# Patient Record
Sex: FEMALE | Race: WHITE | NOT HISPANIC OR LATINO
[De-identification: names, ages, dates, MRNs, and addresses within clinical notes are randomized per-mention and may not be internally consistent; named-entity substitution may affect disease eponyms.]

---

## 2019-05-03 ENCOUNTER — TRANSCRIPTION ENCOUNTER (OUTPATIENT)
Age: 79
End: 2019-05-03

## 2019-05-20 ENCOUNTER — TRANSCRIPTION ENCOUNTER (OUTPATIENT)
Age: 79
End: 2019-05-20

## 2019-12-04 ENCOUNTER — TRANSCRIPTION ENCOUNTER (OUTPATIENT)
Age: 79
End: 2019-12-04

## 2020-02-16 ENCOUNTER — INPATIENT (INPATIENT)
Facility: HOSPITAL | Age: 80
LOS: 1 days | Discharge: HOME | DRG: 69 | End: 2020-02-18
Attending: FAMILY MEDICINE | Admitting: HOSPITALIST
Payer: MEDICARE

## 2020-02-16 VITALS
DIASTOLIC BLOOD PRESSURE: 93 MMHG | WEIGHT: 132.06 LBS | RESPIRATION RATE: 16 BRPM | OXYGEN SATURATION: 98 % | HEIGHT: 66 IN | SYSTOLIC BLOOD PRESSURE: 201 MMHG | HEART RATE: 87 BPM | TEMPERATURE: 98 F

## 2020-02-16 DIAGNOSIS — M81.0 AGE-RELATED OSTEOPOROSIS WITHOUT CURRENT PATHOLOGICAL FRACTURE: ICD-10-CM

## 2020-02-16 DIAGNOSIS — G47.00 INSOMNIA, UNSPECIFIED: ICD-10-CM

## 2020-02-16 DIAGNOSIS — R47.81 SLURRED SPEECH: ICD-10-CM

## 2020-02-16 DIAGNOSIS — G45.9 TRANSIENT CEREBRAL ISCHEMIC ATTACK, UNSPECIFIED: ICD-10-CM

## 2020-02-16 DIAGNOSIS — Z29.9 ENCOUNTER FOR PROPHYLACTIC MEASURES, UNSPECIFIED: ICD-10-CM

## 2020-02-16 LAB
ALBUMIN SERPL ELPH-MCNC: 3.9 G/DL — SIGNIFICANT CHANGE UP (ref 3.3–5)
ALP SERPL-CCNC: 112 U/L — SIGNIFICANT CHANGE UP (ref 40–120)
ALT FLD-CCNC: 22 U/L — SIGNIFICANT CHANGE UP (ref 12–78)
ANION GAP SERPL CALC-SCNC: 7 MMOL/L — SIGNIFICANT CHANGE UP (ref 5–17)
APTT BLD: 32.8 SEC — SIGNIFICANT CHANGE UP (ref 28.5–37)
AST SERPL-CCNC: 24 U/L — SIGNIFICANT CHANGE UP (ref 15–37)
BASOPHILS # BLD AUTO: 0.04 K/UL — SIGNIFICANT CHANGE UP (ref 0–0.2)
BASOPHILS NFR BLD AUTO: 0.5 % — SIGNIFICANT CHANGE UP (ref 0–2)
BILIRUB SERPL-MCNC: 0.3 MG/DL — SIGNIFICANT CHANGE UP (ref 0.2–1.2)
BUN SERPL-MCNC: 19 MG/DL — SIGNIFICANT CHANGE UP (ref 7–23)
CALCIUM SERPL-MCNC: 9.7 MG/DL — SIGNIFICANT CHANGE UP (ref 8.5–10.1)
CHLORIDE SERPL-SCNC: 103 MMOL/L — SIGNIFICANT CHANGE UP (ref 96–108)
CO2 SERPL-SCNC: 28 MMOL/L — SIGNIFICANT CHANGE UP (ref 22–31)
CREAT SERPL-MCNC: 0.78 MG/DL — SIGNIFICANT CHANGE UP (ref 0.5–1.3)
EOSINOPHIL # BLD AUTO: 0.14 K/UL — SIGNIFICANT CHANGE UP (ref 0–0.5)
EOSINOPHIL NFR BLD AUTO: 1.7 % — SIGNIFICANT CHANGE UP (ref 0–6)
GLUCOSE SERPL-MCNC: 97 MG/DL — SIGNIFICANT CHANGE UP (ref 70–99)
HCT VFR BLD CALC: 38.7 % — SIGNIFICANT CHANGE UP (ref 34.5–45)
HGB BLD-MCNC: 12.3 G/DL — SIGNIFICANT CHANGE UP (ref 11.5–15.5)
IMM GRANULOCYTES NFR BLD AUTO: 0.2 % — SIGNIFICANT CHANGE UP (ref 0–1.5)
INR BLD: 1.1 RATIO — SIGNIFICANT CHANGE UP (ref 0.88–1.16)
LYMPHOCYTES # BLD AUTO: 2.5 K/UL — SIGNIFICANT CHANGE UP (ref 1–3.3)
LYMPHOCYTES # BLD AUTO: 30.7 % — SIGNIFICANT CHANGE UP (ref 13–44)
MCHC RBC-ENTMCNC: 28 PG — SIGNIFICANT CHANGE UP (ref 27–34)
MCHC RBC-ENTMCNC: 31.8 GM/DL — LOW (ref 32–36)
MCV RBC AUTO: 88 FL — SIGNIFICANT CHANGE UP (ref 80–100)
MONOCYTES # BLD AUTO: 0.53 K/UL — SIGNIFICANT CHANGE UP (ref 0–0.9)
MONOCYTES NFR BLD AUTO: 6.5 % — SIGNIFICANT CHANGE UP (ref 2–14)
NEUTROPHILS # BLD AUTO: 4.92 K/UL — SIGNIFICANT CHANGE UP (ref 1.8–7.4)
NEUTROPHILS NFR BLD AUTO: 60.4 % — SIGNIFICANT CHANGE UP (ref 43–77)
NRBC # BLD: 0 /100 WBCS — SIGNIFICANT CHANGE UP (ref 0–0)
PLATELET # BLD AUTO: 274 K/UL — SIGNIFICANT CHANGE UP (ref 150–400)
POTASSIUM SERPL-MCNC: 4.1 MMOL/L — SIGNIFICANT CHANGE UP (ref 3.5–5.3)
POTASSIUM SERPL-SCNC: 4.1 MMOL/L — SIGNIFICANT CHANGE UP (ref 3.5–5.3)
PROT SERPL-MCNC: 8.8 G/DL — HIGH (ref 6–8.3)
PROTHROM AB SERPL-ACNC: 12.4 SEC — SIGNIFICANT CHANGE UP (ref 10–12.9)
RBC # BLD: 4.4 M/UL — SIGNIFICANT CHANGE UP (ref 3.8–5.2)
RBC # FLD: 14.8 % — HIGH (ref 10.3–14.5)
SODIUM SERPL-SCNC: 138 MMOL/L — SIGNIFICANT CHANGE UP (ref 135–145)
WBC # BLD: 8.15 K/UL — SIGNIFICANT CHANGE UP (ref 3.8–10.5)
WBC # FLD AUTO: 8.15 K/UL — SIGNIFICANT CHANGE UP (ref 3.8–10.5)

## 2020-02-16 PROCEDURE — 70496 CT ANGIOGRAPHY HEAD: CPT | Mod: 26

## 2020-02-16 PROCEDURE — 93010 ELECTROCARDIOGRAM REPORT: CPT

## 2020-02-16 PROCEDURE — 70450 CT HEAD/BRAIN W/O DYE: CPT | Mod: 26,59

## 2020-02-16 PROCEDURE — 99223 1ST HOSP IP/OBS HIGH 75: CPT | Mod: GC,AI

## 2020-02-16 PROCEDURE — 70498 CT ANGIOGRAPHY NECK: CPT | Mod: 26

## 2020-02-16 PROCEDURE — 71045 X-RAY EXAM CHEST 1 VIEW: CPT | Mod: 26

## 2020-02-16 PROCEDURE — 99285 EMERGENCY DEPT VISIT HI MDM: CPT

## 2020-02-16 RX ORDER — AMITRIPTYLINE HCL 25 MG
10 TABLET ORAL AT BEDTIME
Refills: 0 | Status: DISCONTINUED | OUTPATIENT
Start: 2020-02-16 | End: 2020-02-18

## 2020-02-16 RX ORDER — HYDRALAZINE HCL 50 MG
5 TABLET ORAL EVERY 6 HOURS
Refills: 0 | Status: DISCONTINUED | OUTPATIENT
Start: 2020-02-16 | End: 2020-02-18

## 2020-02-16 RX ORDER — ASPIRIN/CALCIUM CARB/MAGNESIUM 324 MG
325 TABLET ORAL ONCE
Refills: 0 | Status: COMPLETED | OUTPATIENT
Start: 2020-02-16 | End: 2020-02-16

## 2020-02-16 RX ORDER — ENOXAPARIN SODIUM 100 MG/ML
40 INJECTION SUBCUTANEOUS AT BEDTIME
Refills: 0 | Status: DISCONTINUED | OUTPATIENT
Start: 2020-02-16 | End: 2020-02-18

## 2020-02-16 RX ORDER — ATORVASTATIN CALCIUM 80 MG/1
40 TABLET, FILM COATED ORAL AT BEDTIME
Refills: 0 | Status: DISCONTINUED | OUTPATIENT
Start: 2020-02-16 | End: 2020-02-18

## 2020-02-16 RX ORDER — LANOLIN ALCOHOL/MO/W.PET/CERES
3 CREAM (GRAM) TOPICAL AT BEDTIME
Refills: 0 | Status: DISCONTINUED | OUTPATIENT
Start: 2020-02-16 | End: 2020-02-18

## 2020-02-16 RX ORDER — LANOLIN ALCOHOL/MO/W.PET/CERES
1 CREAM (GRAM) TOPICAL
Qty: 0 | Refills: 0 | DISCHARGE

## 2020-02-16 RX ORDER — ASPIRIN/CALCIUM CARB/MAGNESIUM 324 MG
325 TABLET ORAL DAILY
Refills: 0 | Status: DISCONTINUED | OUTPATIENT
Start: 2020-02-17 | End: 2020-02-18

## 2020-02-16 RX ORDER — ALPRAZOLAM 0.25 MG
0.25 TABLET ORAL ONCE
Refills: 0 | Status: DISCONTINUED | OUTPATIENT
Start: 2020-02-17 | End: 2020-02-17

## 2020-02-16 RX ORDER — AMITRIPTYLINE HCL 25 MG
1 TABLET ORAL
Qty: 0 | Refills: 0 | DISCHARGE

## 2020-02-16 RX ORDER — RISEDRONATE SODIUM 25.8; 4.2 MG/1; MG/1
1 TABLET, FILM COATED ORAL
Qty: 0 | Refills: 0 | DISCHARGE

## 2020-02-16 RX ADMIN — Medication 10 MILLIGRAM(S): at 22:51

## 2020-02-16 RX ADMIN — Medication 325 MILLIGRAM(S): at 19:12

## 2020-02-16 RX ADMIN — ATORVASTATIN CALCIUM 40 MILLIGRAM(S): 80 TABLET, FILM COATED ORAL at 21:10

## 2020-02-16 RX ADMIN — Medication 3 MILLIGRAM(S): at 21:10

## 2020-02-16 RX ADMIN — ENOXAPARIN SODIUM 40 MILLIGRAM(S): 100 INJECTION SUBCUTANEOUS at 21:10

## 2020-02-16 RX ADMIN — Medication 5 MILLIGRAM(S): at 20:14

## 2020-02-16 NOTE — ED PROVIDER NOTE - OBJECTIVE STATEMENT
pt bib family for 5 minute episode of slurred speech and right facial droop and right arm tingling began 30 minutes ago. symptoms now fully resolved. no fevers, ha, cp, sob, abd pain, d/n/v.  pmd - jolynn

## 2020-02-16 NOTE — ED ADULT NURSE NOTE - NS TRANSFER PATIENT BELONGINGS
At [facility], one important tool we use to improve our patient services is our Patient Survey. Following your visit you may receive our survey in the mail. Please take the time to complete the survey. If your visit with us was great, we want to hear about it. If we can improve, please let us know how.
Clothing

## 2020-02-16 NOTE — ED ADULT NURSE NOTE - NSIMPLEMENTINTERV_GEN_ALL_ED
Implemented All Universal Safety Interventions:  [place] to call system. Call bell, personal items and telephone within reach. Instruct patient to call for assistance. Room bathroom lighting operational. Non-slip footwear when patient is off stretcher. Physically safe environment: no spills, clutter or unnecessary equipment. Stretcher in lowest position, wheels locked, appropriate side rails in place.

## 2020-02-16 NOTE — H&P ADULT - NSHPREVIEWOFSYSTEMS_GEN_ALL_CORE
Constitutional: denies fever, chills, sweating  HEENT: denies headache, dizziness, or lightheadedness  Respiratory: denies SOB, cough, or wheezing  Cardiovascular: denies CP, palpitations  Gastrointestinal: denies nausea, vomiting, diarrhea, constipation, abdominal pain, or bloody stools  Genitourinary: denies painful urination, increased frequency, urgency, or bloody urine  Skin/Breast: denies rashes or itching  Musculoskeletal: admits to resolved RUE weakness, R facial droop. denies muscle aches, joint swelling  Neurologic: admits to resolved right hand numbness/tingling, resolved slurred speech; denies loss of sensation  ROS negative except as noted above

## 2020-02-16 NOTE — H&P ADULT - PROBLEM SELECTOR PLAN 4
DVT ppx with lovenox 40mg SQ daily    IMPROVE VTE Individual Risk Assessment          RISK                                                          Points  [  ] Previous VTE                                                3  [  ] Thrombophilia                                             2  [  ] Lower limb paralysis                                   2        (unable to hold up >15 seconds)    [  ] Current Cancer                                             2         (within 6 months)  [  ] Immobilization > 24 hrs                              1  [  ] ICU/CCU stay > 24 hours                             1  [ x ] Age > 60                                                         1    IMPROVE VTE Score: 1

## 2020-02-16 NOTE — ED ADULT NURSE NOTE - CHPI ED NUR SYMPTOMS NEG
no confusion/no loss of consciousness/no change in level of consciousness/no blurred vision/no fever

## 2020-02-16 NOTE — PATIENT PROFILE ADULT - VISION (WITH CORRECTIVE LENSES IF THE PATIENT USUALLY WEARS THEM):
reading glasses at times/Partially impaired: cannot see medication labels or newsprint, but can see obstacles in path, and the surrounding layout; can count fingers at arm's length

## 2020-02-16 NOTE — H&P ADULT - HISTORY OF PRESENT ILLNESS
81 y/o F with PMHx of spastic colon, insomnia presented to the ED with complaint of right arm numbness, facial weakness and slurred speech lasting 5 minutes in duration, occurring 30 minutes prior to ED presentation. Symptoms resolved at present.    In the ED, VS significant for BP to 202/95. Labs unremarkable. CXR (compared to study from 5/2019) showed: previously reported nodular opacity within the medial aspect of the right lower lung field appears unchanged with an additional region of nodular opacity identified overlying the right upper lung field measuring approximately 1 cm. CT chest evaluation is recommended for further assessment. Chronic deformity of proximal right humerus. CT head (stroke protocol) negative for acute hemorrhage or territorial infarct. CTA Brain and Neck: No large vessel occlusion. No high-grade stenosis. 81 y/o F with PMHx of insomnia, osteoporosis, and arthritis presented to the ED with complaint of right arm numbness/tingling/transient paralysis, right facial weakness, and slurred speech. Patient and daughter state that patient was sitting at the table when her right arm began to feel numb, predominantly the right hand, and she started having tingling. Daughter states she then noted her right side of her face was drooping and she began to slur her speech. Daughter states the slurred speech and right facial droop lasted 3-5 minutes. The RUE symptoms lasted ~10 minutes. All symptoms occurred approx 30 minutes prior to ED presentation and all symptoms resolved at present. Patient admits to some disorientation to lights prior to emergence of any symptoms. Denies any similar prior episodes.     In the ED, VS significant for BP to 202/95.   Labs unremarkable.   CXR (compared to study from 5/2019) showed: previously reported nodular opacity within the medial aspect of the right lower lung field appears unchanged with an additional region of nodular opacity identified overlying the right upper lung field measuring approximately 1 cm. CT chest evaluation is recommended for further assessment. Chronic deformity of proximal right humerus.   CT head (stroke protocol) negative for acute hemorrhage or territorial infarct.   CTA Brain and Neck: No large vessel occlusion. No high-grade stenosis.  EKG: NSR 82bpm    S/p ASA 325mg 79 y/o F with PMHx of insomnia, osteoporosis, migraines and arthritis presented to the ED with complaint of right arm numbness/tingling/transient paralysis, right facial weakness, and slurred speech. Patient and daughter state that patient was sitting at the table when her right arm began to feel numb, predominantly the right hand, and she started having tingling. Daughter states she then noted her right side of her face was drooping and she began to slur her speech. Daughter states the slurred speech and right facial droop lasted 3-5 minutes. The RUE symptoms lasted ~10 minutes. All symptoms occurred approx 30 minutes prior to ED presentation and all symptoms resolved at present. Patient admits to some disorientation to lights prior to emergence of any symptoms. Denies any similar prior episodes.     In the ED, VS significant for BP to 202/95.   Labs unremarkable.   CXR (compared to study from 5/2019) showed: previously reported nodular opacity within the medial aspect of the right lower lung field appears unchanged with an additional region of nodular opacity identified overlying the right upper lung field measuring approximately 1 cm. CT chest evaluation is recommended for further assessment. Chronic deformity of proximal right humerus.   CT head (stroke protocol) negative for acute hemorrhage or territorial infarct.   CTA Brain and Neck: No large vessel occlusion. No high-grade stenosis.  EKG: NSR 82bpm    S/p ASA 325mg

## 2020-02-16 NOTE — H&P ADULT - NSICDXPASTMEDICALHX_GEN_ALL_CORE_FT
PAST MEDICAL HISTORY:  Insomnia     Spastic colon PAST MEDICAL HISTORY:  Arthritis     Insomnia     Osteoporosis

## 2020-02-16 NOTE — H&P ADULT - NSHPSOCIALHISTORY_GEN_ALL_CORE
Social History/Preventive Medicine:    Occupation: retired  Lives alone in [place].  Ambulates at home without assistance.    Substance Use:  Tobacco Usage: denies  Alcohol Usage: denies  Illicit Drug Usage: denies

## 2020-02-16 NOTE — H&P ADULT - PROBLEM SELECTOR PLAN 1
TIA vs r/o CVA with resolved slurred speech, RUE weakness/numbness/tingling, and right facial droop  - Admit to telemetry  - S/p ASA 325mg x1 in ED, continue daily  - Continue permissive HTN as per neuro, Hydralazine 5mg IVP Q6H PRN for SBP >200  - CT Head and CTA Brain/Neck negative for acute pathology  - Aspiration precautions, passed bedside swallow in ED  - Fall precautions  - F/u MRI Brain  - F/u TTE results  - F/u Lipid panel, HbA1c, TSH, B12, folate levels  - Neurochecks Q4H  - Neuro Dr. Hendrickson following, recs appreciated TIA vs r/o CVA with resolved slurred speech, RUE weakness/numbness/tingling, and right facial droop  - Admit to telemetry  - S/p ASA 325mg x1 in ED  - continue aspirin and statin daily   - Continue permissive HTN as per neuro, Hydralazine 5mg IVP Q6H PRN for SBP >200, monitor hemodynamics  - CT Head and CTA Brain/Neck negative for acute pathology  - Aspiration precautions, passed bedside swallow in ED, will start diet  - Fall precautions  - F/u MRI Brain (will premedicate with low dose xanax due to claustrophobia)  - F/u TTE results  - F/u Lipid panel, HbA1c, TSH, B12, folate levels  - Neurochecks Q4H  - Neuro Dr. Hendrickson following, recs appreciated

## 2020-02-16 NOTE — ED ADULT NURSE NOTE - OBJECTIVE STATEMENT
received pt in bed alert and oriented x4.  C/O right facial droop and tingling in right hand/arm.  Onset about half hour before arrival.  Pt symptoms have resolved at this time.  Pt reports no numbness or tingling at this time.  Ct complete.  Pt on monitor.  Ongoing nursing care and safety maintained.

## 2020-02-16 NOTE — ED PROVIDER NOTE - CLINICAL SUMMARY MEDICAL DECISION MAKING FREE TEXT BOX
pt with 5 min episode of slurred speech, facial droop, arm numbness now resolved - ekg/ct/xr/labs/neuro

## 2020-02-16 NOTE — H&P ADULT - ASSESSMENT
81 y/o F with PMHx of spastic colon, insomnia presented to the ED with complaint of right arm numbness, facial weakness and slurred speech lasting 5 minutes, with complete resolution of symptoms admitted for TIA, rule out CVA. 81 y/o F with PMHx of migraines, osteoporosis, arthritis, insomnia presented to the ED with complaint of right arm numbness, facial weakness and slurred speech lasting 5 minutes, with complete resolution of symptoms admitted for TIA, rule out CVA.

## 2020-02-16 NOTE — ED PROVIDER NOTE - CARE PLAN
Principal Discharge DX:	Slurred speech  Secondary Diagnosis:	Facial droop  Secondary Diagnosis:	Arm numbness

## 2020-02-16 NOTE — PROGRESS NOTE ADULT - SUBJECTIVE AND OBJECTIVE BOX
neuro cons dicty.  right arm numbness, facial weakness and slurred speech resolved  cw tia, r/o cva  brain mri without.  echo  lipid panel  asa/statin.  neuro check  avoid antihypertensive until sbp over 200 mm.

## 2020-02-17 DIAGNOSIS — R91.1 SOLITARY PULMONARY NODULE: ICD-10-CM

## 2020-02-17 LAB
ALBUMIN SERPL ELPH-MCNC: 3.4 G/DL — SIGNIFICANT CHANGE UP (ref 3.3–5)
ALP SERPL-CCNC: 98 U/L — SIGNIFICANT CHANGE UP (ref 40–120)
ALT FLD-CCNC: 17 U/L — SIGNIFICANT CHANGE UP (ref 12–78)
ANION GAP SERPL CALC-SCNC: 6 MMOL/L — SIGNIFICANT CHANGE UP (ref 5–17)
AST SERPL-CCNC: 16 U/L — SIGNIFICANT CHANGE UP (ref 15–37)
BASOPHILS # BLD AUTO: 0.03 K/UL — SIGNIFICANT CHANGE UP (ref 0–0.2)
BASOPHILS NFR BLD AUTO: 0.4 % — SIGNIFICANT CHANGE UP (ref 0–2)
BILIRUB SERPL-MCNC: 0.4 MG/DL — SIGNIFICANT CHANGE UP (ref 0.2–1.2)
BUN SERPL-MCNC: 16 MG/DL — SIGNIFICANT CHANGE UP (ref 7–23)
CALCIUM SERPL-MCNC: 9.3 MG/DL — SIGNIFICANT CHANGE UP (ref 8.5–10.1)
CHLORIDE SERPL-SCNC: 105 MMOL/L — SIGNIFICANT CHANGE UP (ref 96–108)
CHOLEST SERPL-MCNC: 167 MG/DL — SIGNIFICANT CHANGE UP (ref 10–199)
CO2 SERPL-SCNC: 29 MMOL/L — SIGNIFICANT CHANGE UP (ref 22–31)
CREAT SERPL-MCNC: 0.83 MG/DL — SIGNIFICANT CHANGE UP (ref 0.5–1.3)
EOSINOPHIL # BLD AUTO: 0.13 K/UL — SIGNIFICANT CHANGE UP (ref 0–0.5)
EOSINOPHIL NFR BLD AUTO: 1.9 % — SIGNIFICANT CHANGE UP (ref 0–6)
FOLATE SERPL-MCNC: 19.5 NG/ML — SIGNIFICANT CHANGE UP
GLUCOSE SERPL-MCNC: 95 MG/DL — SIGNIFICANT CHANGE UP (ref 70–99)
HBA1C BLD-MCNC: 5.8 % — HIGH (ref 4–5.6)
HCT VFR BLD CALC: 36.7 % — SIGNIFICANT CHANGE UP (ref 34.5–45)
HDLC SERPL-MCNC: 62 MG/DL — SIGNIFICANT CHANGE UP
HGB BLD-MCNC: 11.9 G/DL — SIGNIFICANT CHANGE UP (ref 11.5–15.5)
IMM GRANULOCYTES NFR BLD AUTO: 0.1 % — SIGNIFICANT CHANGE UP (ref 0–1.5)
LIPID PNL WITH DIRECT LDL SERPL: 94 MG/DL — SIGNIFICANT CHANGE UP
LYMPHOCYTES # BLD AUTO: 2.2 K/UL — SIGNIFICANT CHANGE UP (ref 1–3.3)
LYMPHOCYTES # BLD AUTO: 32.4 % — SIGNIFICANT CHANGE UP (ref 13–44)
MAGNESIUM SERPL-MCNC: 2.2 MG/DL — SIGNIFICANT CHANGE UP (ref 1.6–2.6)
MCHC RBC-ENTMCNC: 28.2 PG — SIGNIFICANT CHANGE UP (ref 27–34)
MCHC RBC-ENTMCNC: 32.4 GM/DL — SIGNIFICANT CHANGE UP (ref 32–36)
MCV RBC AUTO: 87 FL — SIGNIFICANT CHANGE UP (ref 80–100)
MONOCYTES # BLD AUTO: 0.66 K/UL — SIGNIFICANT CHANGE UP (ref 0–0.9)
MONOCYTES NFR BLD AUTO: 9.7 % — SIGNIFICANT CHANGE UP (ref 2–14)
NEUTROPHILS # BLD AUTO: 3.77 K/UL — SIGNIFICANT CHANGE UP (ref 1.8–7.4)
NEUTROPHILS NFR BLD AUTO: 55.5 % — SIGNIFICANT CHANGE UP (ref 43–77)
NRBC # BLD: 0 /100 WBCS — SIGNIFICANT CHANGE UP (ref 0–0)
PHOSPHATE SERPL-MCNC: 3.3 MG/DL — SIGNIFICANT CHANGE UP (ref 2.5–4.5)
PLATELET # BLD AUTO: 273 K/UL — SIGNIFICANT CHANGE UP (ref 150–400)
POTASSIUM SERPL-MCNC: 3.7 MMOL/L — SIGNIFICANT CHANGE UP (ref 3.5–5.3)
POTASSIUM SERPL-SCNC: 3.7 MMOL/L — SIGNIFICANT CHANGE UP (ref 3.5–5.3)
PROT SERPL-MCNC: 7.8 G/DL — SIGNIFICANT CHANGE UP (ref 6–8.3)
RBC # BLD: 4.22 M/UL — SIGNIFICANT CHANGE UP (ref 3.8–5.2)
RBC # FLD: 14.8 % — HIGH (ref 10.3–14.5)
SODIUM SERPL-SCNC: 140 MMOL/L — SIGNIFICANT CHANGE UP (ref 135–145)
TOTAL CHOLESTEROL/HDL RATIO MEASUREMENT: 2.7 RATIO — LOW (ref 3.3–7.1)
TRIGL SERPL-MCNC: 57 MG/DL — SIGNIFICANT CHANGE UP (ref 10–149)
TSH SERPL-MCNC: 8.47 UIU/ML — HIGH (ref 0.36–3.74)
VIT B12 SERPL-MCNC: 421 PG/ML — SIGNIFICANT CHANGE UP (ref 232–1245)
WBC # BLD: 6.8 K/UL — SIGNIFICANT CHANGE UP (ref 3.8–10.5)
WBC # FLD AUTO: 6.8 K/UL — SIGNIFICANT CHANGE UP (ref 3.8–10.5)

## 2020-02-17 PROCEDURE — 70551 MRI BRAIN STEM W/O DYE: CPT | Mod: 26

## 2020-02-17 PROCEDURE — 93306 TTE W/DOPPLER COMPLETE: CPT | Mod: 26

## 2020-02-17 PROCEDURE — 99233 SBSQ HOSP IP/OBS HIGH 50: CPT

## 2020-02-17 PROCEDURE — 99222 1ST HOSP IP/OBS MODERATE 55: CPT

## 2020-02-17 RX ORDER — AMLODIPINE BESYLATE 2.5 MG/1
10 TABLET ORAL DAILY
Refills: 0 | Status: DISCONTINUED | OUTPATIENT
Start: 2020-02-17 | End: 2020-02-18

## 2020-02-17 RX ADMIN — ENOXAPARIN SODIUM 40 MILLIGRAM(S): 100 INJECTION SUBCUTANEOUS at 21:19

## 2020-02-17 RX ADMIN — ATORVASTATIN CALCIUM 40 MILLIGRAM(S): 80 TABLET, FILM COATED ORAL at 21:19

## 2020-02-17 RX ADMIN — Medication 325 MILLIGRAM(S): at 13:35

## 2020-02-17 RX ADMIN — Medication 1 TABLET(S): at 13:35

## 2020-02-17 RX ADMIN — Medication 0.25 MILLIGRAM(S): at 10:23

## 2020-02-17 RX ADMIN — Medication 3 MILLIGRAM(S): at 21:19

## 2020-02-17 RX ADMIN — Medication 10 MILLIGRAM(S): at 21:19

## 2020-02-17 RX ADMIN — AMLODIPINE BESYLATE 10 MILLIGRAM(S): 2.5 TABLET ORAL at 15:48

## 2020-02-17 NOTE — PROGRESS NOTE ADULT - SUBJECTIVE AND OBJECTIVE BOX
Patient is a 80y old  Female who presents with a chief complaint of right arm numbness, facial weakness and slurred speech (16 Feb 2020 18:42)      INTERVAL HPI/OVERNIGHT EVENTS: No new symptoms, events, complaints. Remains afebrile. Denies chest pain, palpitation, sob. States that never has these type of symptoms  before.     MEDICATIONS  (STANDING):  amitriptyline 10 milliGRAM(s) Oral at bedtime  aspirin 325 milliGRAM(s) Oral daily  atorvastatin 40 milliGRAM(s) Oral at bedtime  calcium carbonate 1250 mG  + Vitamin D (OsCal 500 + D) 1 Tablet(s) Oral daily  enoxaparin Injectable 40 milliGRAM(s) SubCutaneous at bedtime  melatonin 3 milliGRAM(s) Oral at bedtime    MEDICATIONS  (PRN):  ALPRAZolam 0.25 milliGRAM(s) Oral once PRN Prior to MRI  hydrALAZINE Injectable 5 milliGRAM(s) IV Push every 6 hours PRN sbp >200      Allergies    No Known Allergies    Intolerances        REVIEW OF SYSTEMS:  CONSTITUTIONAL: No fever, or fatigue  EYES: No eye pain, visual disturbances, or discharge  ENMT:  No difficulty hearing, tinnitus, vertigo; No sinus or throat pain  NECK: No pain or stiffness  RESPIRATORY: No cough, wheezing, chills or hemoptysis; No shortness of breath  CARDIOVASCULAR: No chest pain, palpitations, dizziness, or leg swelling  GASTROINTESTINAL: No abdominal or epigastric pain. No nausea, vomiting, or hematemesis; No diarrhea or constipation.   GENITOURINARY: No dysuria, frequency, hematuria, or incontinence  NEUROLOGICAL: No headaches, memory loss, loss of strength, numbness, or tremors  SKIN: No itching, burning, rashes, or lesions   LYMPH NODES: No enlarged glands  ENDOCRINE: No heat or cold intolerance; No hair loss; No polydipsia or polyuria  MUSCULOSKELETAL: No joint pain or swelling; No muscle, back, or extremity pain  PSYCHIATRIC: No depression, anxiety, mood swings, or difficulty sleeping  HEME/LYMPH: No easy bruising, or bleeding gums  ALLERGY AND IMMUNOLOGIC: No hives or eczema    Vital Signs Last 24 Hrs  T(C): 36.9 (17 Feb 2020 07:07), Max: 37.2 (16 Feb 2020 22:07)  T(F): 98.4 (17 Feb 2020 07:07), Max: 99 (16 Feb 2020 22:07)  HR: 84 (17 Feb 2020 07:07) (63 - 92)  BP: 177/81 (17 Feb 2020 07:07) (128/68 - 212/102)  BP(mean): --  RR: 16 (17 Feb 2020 07:07) (16 - 19)  SpO2: 98% (17 Feb 2020 07:07) (96% - 100%)    PHYSICAL EXAM:  GENERAL: NAD, well-groomed, well-developed  HEAD:  Atraumatic, Normocephalic  EYES: EOMI, PERRLA, conjunctiva and sclera clear  ENMT: No tonsillar erythema, exudates, or enlargement; Moist mucous membranes  NECK: Supple, No JVD, Normal thyroid  NERVOUS SYSTEM:  Alert & Oriented X3, Good concentration; Motor Strength 5/5 B/L upper and lower extremities  CHEST/LUNG: Clear to auscultation bilaterally; No rales, rhonchi, wheezing, or rubs  HEART: Regular rate and rhythm; No murmurs, rubs, or gallops  ABDOMEN: Soft, Nontender, Nondistended; Bowel sounds present  EXTREMITIES:  2+ Peripheral Pulses, No clubbing, cyanosis, or edema  LYMPH: No lymphadenopathy noted  SKIN: No rashes or lesions    LABS:                        11.9   6.80  )-----------( 273      ( 17 Feb 2020 07:18 )             36.7     17 Feb 2020 07:18    140    |  105    |  16     ----------------------------<  95     3.7     |  29     |  0.83     Ca    9.3        17 Feb 2020 07:18  Phos  3.3       17 Feb 2020 07:18  Mg     2.2       17 Feb 2020 07:18    TPro  7.8    /  Alb  3.4    /  TBili  0.4    /  DBili  x      /  AST  16     /  ALT  17     /  AlkPhos  98     17 Feb 2020 07:18    PT/INR - ( 16 Feb 2020 16:57 )   PT: 12.4 sec;   INR: 1.10 ratio         PTT - ( 16 Feb 2020 16:57 )  PTT:32.8 sec  CAPILLARY BLOOD GLUCOSE      POCT Blood Glucose.: 90 mg/dL (16 Feb 2020 16:33)    BLOOD CULTURE    RADIOLOGY & ADDITIONAL TESTS:    Imaging Personally Reviewed:  [ ] YES     Consultant(s) Notes Reviewed:      Care Discussed with Consultants/Other Providers:

## 2020-02-17 NOTE — CONSULT NOTE ADULT - ASSESSMENT
80 year old female with insomnia and arthritis, who presents with arm and facial numbness, suggestive of a TIA.  She has no significant cardiac history and no symptoms suggestive of Paroxysmal atrial fibrillation in the past.    - symptoms consistent with TIA. MRI is pending.  - remains in a sr on telemetry. No atrial arrythmia noted to date. Continue to watch.  - await echocardiogram  - discussed the fact that a cryptogenic TIA could be indicative of AF, and she will need longer term monitoring  - cont asa and statin  - permissive HTN per neurology, though will need to anti-hypertensives within next 24 hours  - neuro follow up    - no sign of acute ischemia  - no sign of volume overload    - watch creatinine and electrolytes. Keep K>4, mg>2  - will follow with you

## 2020-02-17 NOTE — PROGRESS NOTE ADULT - SUBJECTIVE AND OBJECTIVE BOX
Neurology Follow up note    NICHOLAS [id number]    HPI:  79 y/o F with PMHx of insomnia, osteoporosis, migraines and arthritis presented to the ED with complaint of right arm numbness/tingling/transient paralysis, right facial weakness, and slurred speech. Patient and daughter state that patient was sitting at the table when her right arm began to feel numb, predominantly the right hand, and she started having tingling. Daughter states she then noted her right side of her face was drooping and she began to slur her speech. Daughter states the slurred speech and right facial droop lasted 3-5 minutes. The RUE symptoms lasted ~10 minutes. All symptoms occurred approx 30 minutes prior to ED presentation and all symptoms resolved at present. Patient admits to some disorientation to lights prior to emergence of any symptoms. Denies any similar prior episodes.     In the ED, VS significant for BP to 202/95.   Labs unremarkable.   CXR (compared to study from 5/2019) showed: previously reported nodular opacity within the medial aspect of the right lower lung field appears unchanged with an additional region of nodular opacity identified overlying the right upper lung field measuring approximately 1 cm. CT chest evaluation is recommended for further assessment. Chronic deformity of proximal right humerus.   CT head (stroke protocol) negative for acute hemorrhage or territorial infarct.   CTA Brain and Neck: No large vessel occlusion. No high-grade stenosis.  EKG: NSR 82bpm    S/p ASA 325mg (16 Feb 2020 18:42)      Interval History - no complaints    Patient is seen, chart was reviewed and case was discussed with the treatment team.  Pt is not in any distress.   Lying on bed comfortably.   No events reported overnight.   No clinical seizure was reported..    Vital Signs Last 24 Hrs  T(C): 36.7 (17 Feb 2020 15:27), Max: 37.2 (16 Feb 2020 22:07)  T(F): 98.1 (17 Feb 2020 15:27), Max: 99 (16 Feb 2020 22:07)  HR: 79 (17 Feb 2020 15:27) (63 - 92)  BP: 144/75 (17 Feb 2020 15:27) (128/68 - 212/102)  BP(mean): --  RR: 16 (17 Feb 2020 15:27) (16 - 19)  SpO2: 99% (17 Feb 2020 15:27) (96% - 100%)    Height (cm): 167.64 (02-16 @ 16:16)  Weight (kg): 59.9 (02-16 @ 16:16)  BMI (kg/m2): 21.3 (02-16 @ 16:16)    REVIEW OF SYSTEMS:    Constitutional: No fever, weight loss or fatigue  Eyes: No eye pain, visual disturbances, or discharge  ENT:  No difficulty hearing, tinnitus, vertigo; No sinus or throat pain  Neck: No pain or stiffness  Respiratory: No cough, wheezing, chills or hemoptysis  Cardiovascular: No chest pain, palpitations, shortness of breath, dizziness or leg swelling  Gastrointestinal: No abdominal or epigastric pain. No nausea, vomiting or hematemesis; No diarrhea or constipation. No melena or hematochezia.  Genitourinary: No dysuria, frequency, hematuria or incontinence  Neurological: No headaches, memory loss, loss of strength, numbness or tremors  Psychiatric: No depression, anxiety, mood swings or difficulty sleeping  Musculoskeletal: No joint pain or swelling; No muscle, back or extremity pain  Skin: No itching, burning, rashes or lesions   Lymph Nodes: No enlarged glands  Endocrine: No heat or cold intolerance;     On Neurological Examination:    Mental Status - Pt is alert, awake, oriented X3. Higher functions are intact. Follows commands well and able to answer questions appropriately  .Mood and affect  normal    Speech -  Normal.     Cranial Nerves - Pupils 3 mm equal and reactive to light, extraocular eye movements intact. Pt has no visual field deficit.  Pt has no  facial asymmetry. Facial sensation is intact.Tongue - is in midline.    Muscle tone - is normal all over. Moves all extremities equally. No asymmetry is seen.      Motor Exam - 5/5 all over, No drift. No shaking or tremors.    Sensory Exam - Pin prick, temperature, joint position and vibration are intact on either side. Pt withdraws all extremities equally on stimulation. No asymmetry seen. No complaints of tingling, numbness.    Gait - Able to stand and walk unassisted.      coordination:    Finger to nose: normal  .    Deep tendon Reflexes - 2 plus all over.        Romberg - Negative.    Neck Supple -  Yes.     MEDICATIONS    amitriptyline 10 milliGRAM(s) Oral at bedtime  amLODIPine   Tablet 10 milliGRAM(s) Oral daily  aspirin 325 milliGRAM(s) Oral daily  atorvastatin 40 milliGRAM(s) Oral at bedtime  calcium carbonate 1250 mG  + Vitamin D (OsCal 500 + D) 1 Tablet(s) Oral daily  enoxaparin Injectable 40 milliGRAM(s) SubCutaneous at bedtime  hydrALAZINE Injectable 5 milliGRAM(s) IV Push every 6 hours PRN  melatonin 3 milliGRAM(s) Oral at bedtime      Allergies    No Known Allergies    Intolerances        LABS:  CBC Full  -  ( 17 Feb 2020 07:18 )  WBC Count : 6.80 K/uL  RBC Count : 4.22 M/uL  Hemoglobin : 11.9 g/dL  Hematocrit : 36.7 %  Platelet Count - Automated : 273 K/uL  Mean Cell Volume : 87.0 fl      02-17    140  |  105  |  16  ----------------------------<  95  3.7   |  29  |  0.83    Ca    9.3      17 Feb 2020 07:18  Phos  3.3     02-17  Mg     2.2     02-17    TPro  7.8  /  Alb  3.4  /  TBili  0.4  /  DBili  x   /  AST  16  /  ALT  17  /  AlkPhos  98  02-17    Hemoglobin A1C: Hemoglobin A1C, Whole Blood: 5.8 % (02-17 @ 08:14)    Lipid Panel 02-17 @ 10:26  Total Cholesterol, Serum 167  LDL 94  Triglycerides 57    Vitamin B12 Vitamin B12, Serum: 421 pg/mL (02-17 @ 10:26)      RADIOLOGY    ASSESSMENT AND PLAN:      likely TIA  BRAIN MRI - NO ACUTE CVA  HTN    CONTINUE AP/STATIN  STARTED ON AMLODIPINE  Physical therapy evaluation.  Pain is accessed and addressed.  Plan of care was discussed with family. Questions answered.  Would continue to follow.

## 2020-02-17 NOTE — CONSULT NOTE ADULT - REASON FOR ADMISSION
right arm numbness, facial weakness and slurred speech
right arm numbness, facial weakness and slurred speech

## 2020-02-17 NOTE — CONSULT NOTE ADULT - SUBJECTIVE AND OBJECTIVE BOX
Date/Time Patient Seen:  		  Referring MD:   Data Reviewed	       Patient is a 80y old  Female who presents with a chief complaint of right arm numbness, facial weakness and slurred speech (17 Feb 2020 09:40)      Subjective/HPI    in bed  seen and examined  vs and meds reviewed  labs and imaging reviewed    non smoker  non drinker    cxr nodules reviewed with pt  pt was recently seen in [facility] - seen by Pulm and had ct chest         History of Present Illness:  Reason for Admission: right arm numbness, facial weakness and slurred speech  History of Present Illness:   81 y/o F with PMHx of insomnia, osteoporosis, migraines and arthritis presented to the ED with complaint of right arm numbness/tingling/transient paralysis, right facial weakness, and slurred speech. Patient and daughter state that patient was sitting at the table when her right arm began to feel numb, predominantly the right hand, and she started having tingling. Daughter states she then noted her right side of her face was drooping and she began to slur her speech. Daughter states the slurred speech and right facial droop lasted 3-5 minutes. The RUE symptoms lasted ~10 minutes. All symptoms occurred approx 30 minutes prior to ED presentation and all symptoms resolved at present. Patient admits to some disorientation to lights prior to emergence of any symptoms. Denies any similar prior episodes.     PAST SURGICAL HISTORY:  No significant past surgical history.    FAMILY HISTORY:  FH: lung cancer  FH: ovarian cancer  FHx: asthma.     Social History:  Social History (marital status, living situation, occupation, tobacco use, alcohol and drug use, and sexual history): Social History/Preventive Medicine:    	Occupation: retired  	Lives alone in alf community.  	Ambulates at home without assistance.    	Substance Use:  	Tobacco Usage: denies  	Alcohol Usage: denies  Illicit Drug Usage: denies     Tobacco Screening:  · Core Measure Site	Yes  · Has the patient used tobacco in the past 30 days?	No    Risk Assessment:    Present on Admission:  Deep Venous Thrombosis	no  Pulmonary Embolus	no     Heart Failure:  Does this patient have a history of or has been diagnosed with heart failure? no.     PAST MEDICAL & SURGICAL HISTORY:  Arthritis  Osteoporosis  Insomnia  Spastic colon  No pertinent past medical history  No significant past surgical history        Medication list         MEDICATIONS  (STANDING):  amitriptyline 10 milliGRAM(s) Oral at bedtime  aspirin 325 milliGRAM(s) Oral daily  atorvastatin 40 milliGRAM(s) Oral at bedtime  calcium carbonate 1250 mG  + Vitamin D (OsCal 500 + D) 1 Tablet(s) Oral daily  enoxaparin Injectable 40 milliGRAM(s) SubCutaneous at bedtime  melatonin 3 milliGRAM(s) Oral at bedtime    MEDICATIONS  (PRN):  ALPRAZolam 0.25 milliGRAM(s) Oral once PRN Prior to MRI  hydrALAZINE Injectable 5 milliGRAM(s) IV Push every 6 hours PRN sbp >200         Vitals log        ICU Vital Signs Last 24 Hrs  T(C): 36.9 (17 Feb 2020 07:07), Max: 37.2 (16 Feb 2020 22:07)  T(F): 98.4 (17 Feb 2020 07:07), Max: 99 (16 Feb 2020 22:07)  HR: 84 (17 Feb 2020 07:07) (63 - 92)  BP: 177/81 (17 Feb 2020 07:07) (128/68 - 212/102)  BP(mean): --  ABP: --  ABP(mean): --  RR: 16 (17 Feb 2020 07:07) (16 - 19)  SpO2: 98% (17 Feb 2020 07:07) (96% - 100%)           Input and Output:  I&O's Detail    16 Feb 2020 07:01  -  17 Feb 2020 07:00  --------------------------------------------------------  IN:  Total IN: 0 mL    OUT:    Voided: 450 mL  Total OUT: 450 mL    Total NET: -450 mL          Lab Data                        11.9   6.80  )-----------( 273      ( 17 Feb 2020 07:18 )             36.7     02-17    140  |  105  |  16  ----------------------------<  95  3.7   |  29  |  0.83    Ca    9.3      17 Feb 2020 07:18  Phos  3.3     02-17  Mg     2.2     02-17    TPro  7.8  /  Alb  3.4  /  TBili  0.4  /  DBili  x   /  AST  16  /  ALT  17  /  AlkPhos  98  02-17            Review of Systems	  anxious      Objective     Physical Examination    verbal  alert  head nc  head at  heart s1s2  lung dec BS  abd soft      Pertinent Lab findings & Imaging      Blevins:  NO   Adequate UO     I&O's Detail    16 Feb 2020 07:01  -  17 Feb 2020 07:00  --------------------------------------------------------  IN:  Total IN: 0 mL    OUT:    Voided: 450 mL  Total OUT: 450 mL    Total NET: -450 mL               Discussed with:     Cultures:	        Radiology            EXAM:  XR CHEST AP OR PA 1V                            PROCEDURE DATE:  02/16/2020          INTERPRETATION:  INDICATION: Code stroke    PRIORS: 5/3/2019    VIEWS: Portable AP radiography of the chest performed.    FINDINGS: Heart size appears within normal limits. No superior mediastinal widening is identified. Previously reported nodular opacity within the medial aspect of the right lower lung field appears unchanged. There is an additional region of nodular opacity identified overlying the right upper lung field measuring approximately 1 cm. CT chest evaluation is recommended for further assessment. There is no evidence for acute left lung infiltrate. No pleural effusion or pneumothorax is demonstrated. No mediastinal shift is noted. No acute osseous fractures identified. Chronic appearing deformity of the proximal right humerus noted.    IMPRESSION: Previously reported nodular opacity within the medial aspect of the right lower lung field appears unchanged. There is an additional region of nodular opacity identified overlying the right upper lung field measuring approximately 1 cm. CT chest evaluation is recommended for further assessment.    The ED was notified of this result utilizing the [email or web address] discrepancy system.                TONIO BURROUGHS   This document has been electronically signed. Feb 16 2020  5:14PM

## 2020-02-17 NOTE — PROGRESS NOTE ADULT - ATTENDING COMMENTS
F/u MRI, TTE  D/c planning once cleared by Neuro F/u MRI, TTE  D/c planning once cleared by Neuro, based upon PT evaluation  and after  BP is controlled.

## 2020-02-17 NOTE — PROGRESS NOTE ADULT - ASSESSMENT
81 y/o F with PMHx of migraines, osteoporosis, arthritis, insomnia presented to the ED with complaint of right arm numbness, facial weakness and slurred speech lasting 5 minutes, with complete resolution of symptoms admitted for TIA, rule out CVA.
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
## 2020-02-17 NOTE — CONSULT NOTE ADULT - PROBLEM SELECTOR RECOMMENDATION 9
neuro and cardio eval in progress  MRI and ECHO pending  on tele monitor  CXR reviewed  pulm nodules  had a ct chest in [facility] - will attempt to obtain report -   will follow up  pt is a lifetime non smoker

## 2020-02-18 ENCOUNTER — TRANSCRIPTION ENCOUNTER (OUTPATIENT)
Age: 80
End: 2020-02-18

## 2020-02-18 VITALS
OXYGEN SATURATION: 97 % | SYSTOLIC BLOOD PRESSURE: 130 MMHG | DIASTOLIC BLOOD PRESSURE: 75 MMHG | HEART RATE: 80 BPM | TEMPERATURE: 98 F | RESPIRATION RATE: 17 BRPM

## 2020-02-18 LAB
ANION GAP SERPL CALC-SCNC: 5 MMOL/L — SIGNIFICANT CHANGE UP (ref 5–17)
BUN SERPL-MCNC: 17 MG/DL — SIGNIFICANT CHANGE UP (ref 7–23)
CALCIUM SERPL-MCNC: 9.3 MG/DL — SIGNIFICANT CHANGE UP (ref 8.5–10.1)
CHLORIDE SERPL-SCNC: 106 MMOL/L — SIGNIFICANT CHANGE UP (ref 96–108)
CO2 SERPL-SCNC: 30 MMOL/L — SIGNIFICANT CHANGE UP (ref 22–31)
CREAT SERPL-MCNC: 0.85 MG/DL — SIGNIFICANT CHANGE UP (ref 0.5–1.3)
GLUCOSE SERPL-MCNC: 93 MG/DL — SIGNIFICANT CHANGE UP (ref 70–99)
HCT VFR BLD CALC: 37.9 % — SIGNIFICANT CHANGE UP (ref 34.5–45)
HGB BLD-MCNC: 12 G/DL — SIGNIFICANT CHANGE UP (ref 11.5–15.5)
MCHC RBC-ENTMCNC: 27.9 PG — SIGNIFICANT CHANGE UP (ref 27–34)
MCHC RBC-ENTMCNC: 31.7 GM/DL — LOW (ref 32–36)
MCV RBC AUTO: 88.1 FL — SIGNIFICANT CHANGE UP (ref 80–100)
NRBC # BLD: 0 /100 WBCS — SIGNIFICANT CHANGE UP (ref 0–0)
PLATELET # BLD AUTO: 280 K/UL — SIGNIFICANT CHANGE UP (ref 150–400)
POTASSIUM SERPL-MCNC: 4.2 MMOL/L — SIGNIFICANT CHANGE UP (ref 3.5–5.3)
POTASSIUM SERPL-SCNC: 4.2 MMOL/L — SIGNIFICANT CHANGE UP (ref 3.5–5.3)
RBC # BLD: 4.3 M/UL — SIGNIFICANT CHANGE UP (ref 3.8–5.2)
RBC # FLD: 14.8 % — HIGH (ref 10.3–14.5)
SODIUM SERPL-SCNC: 141 MMOL/L — SIGNIFICANT CHANGE UP (ref 135–145)
WBC # BLD: 7.14 K/UL — SIGNIFICANT CHANGE UP (ref 3.8–10.5)
WBC # FLD AUTO: 7.14 K/UL — SIGNIFICANT CHANGE UP (ref 3.8–10.5)

## 2020-02-18 PROCEDURE — 71045 X-RAY EXAM CHEST 1 VIEW: CPT

## 2020-02-18 PROCEDURE — 85027 COMPLETE CBC AUTOMATED: CPT

## 2020-02-18 PROCEDURE — 70551 MRI BRAIN STEM W/O DYE: CPT

## 2020-02-18 PROCEDURE — 84443 ASSAY THYROID STIM HORMONE: CPT

## 2020-02-18 PROCEDURE — 93005 ELECTROCARDIOGRAM TRACING: CPT

## 2020-02-18 PROCEDURE — 80061 LIPID PANEL: CPT

## 2020-02-18 PROCEDURE — 83036 HEMOGLOBIN GLYCOSYLATED A1C: CPT

## 2020-02-18 PROCEDURE — 83735 ASSAY OF MAGNESIUM: CPT

## 2020-02-18 PROCEDURE — 84100 ASSAY OF PHOSPHORUS: CPT

## 2020-02-18 PROCEDURE — 85610 PROTHROMBIN TIME: CPT

## 2020-02-18 PROCEDURE — 82746 ASSAY OF FOLIC ACID SERUM: CPT

## 2020-02-18 PROCEDURE — 36415 COLL VENOUS BLD VENIPUNCTURE: CPT

## 2020-02-18 PROCEDURE — 82962 GLUCOSE BLOOD TEST: CPT

## 2020-02-18 PROCEDURE — 70498 CT ANGIOGRAPHY NECK: CPT

## 2020-02-18 PROCEDURE — 82607 VITAMIN B-12: CPT

## 2020-02-18 PROCEDURE — 70450 CT HEAD/BRAIN W/O DYE: CPT

## 2020-02-18 PROCEDURE — 99285 EMERGENCY DEPT VISIT HI MDM: CPT | Mod: 25

## 2020-02-18 PROCEDURE — 80048 BASIC METABOLIC PNL TOTAL CA: CPT

## 2020-02-18 PROCEDURE — 80053 COMPREHEN METABOLIC PANEL: CPT

## 2020-02-18 PROCEDURE — 99239 HOSP IP/OBS DSCHRG MGMT >30: CPT

## 2020-02-18 PROCEDURE — 85730 THROMBOPLASTIN TIME PARTIAL: CPT

## 2020-02-18 PROCEDURE — 99232 SBSQ HOSP IP/OBS MODERATE 35: CPT

## 2020-02-18 PROCEDURE — 70496 CT ANGIOGRAPHY HEAD: CPT

## 2020-02-18 RX ORDER — ATORVASTATIN CALCIUM 80 MG/1
1 TABLET, FILM COATED ORAL
Qty: 14 | Refills: 0
Start: 2020-02-18 | End: 2020-03-02

## 2020-02-18 RX ORDER — AMLODIPINE BESYLATE 2.5 MG/1
1 TABLET ORAL
Qty: 14 | Refills: 0
Start: 2020-02-18 | End: 2020-03-02

## 2020-02-18 RX ORDER — ASPIRIN/CALCIUM CARB/MAGNESIUM 324 MG
1 TABLET ORAL
Qty: 0 | Refills: 0 | DISCHARGE
Start: 2020-02-18

## 2020-02-18 RX ORDER — AMLODIPINE BESYLATE 2.5 MG/1
1 TABLET ORAL
Qty: 0 | Refills: 0 | DISCHARGE
Start: 2020-02-18

## 2020-02-18 RX ORDER — ASPIRIN/CALCIUM CARB/MAGNESIUM 324 MG
1 TABLET ORAL
Qty: 14 | Refills: 0
Start: 2020-02-18 | End: 2020-03-02

## 2020-02-18 RX ORDER — ATORVASTATIN CALCIUM 80 MG/1
1 TABLET, FILM COATED ORAL
Qty: 0 | Refills: 0 | DISCHARGE
Start: 2020-02-18

## 2020-02-18 RX ADMIN — Medication 325 MILLIGRAM(S): at 11:58

## 2020-02-18 RX ADMIN — Medication 1 TABLET(S): at 11:59

## 2020-02-18 RX ADMIN — AMLODIPINE BESYLATE 10 MILLIGRAM(S): 2.5 TABLET ORAL at 06:10

## 2020-02-18 NOTE — PROGRESS NOTE ADULT - REASON FOR ADMISSION
right arm numbness, facial weakness and slurred speech

## 2020-02-18 NOTE — DISCHARGE NOTE PROVIDER - NSDCQMSTROKERISK_NEU_ALL_CORE
Subjective:      History was provided by the mother. Holly Harris is a 4 y.o. female who is brought in for this well child visit. Birth History    Birth     Length: 1' 8\" (0.508 m)     Weight: 7 lb 3 oz (3.26 kg)    Gestation Age: 40 wks     There are no active problems to display for this patient. History reviewed. No pertinent past medical history. Immunization History   Administered Date(s) Administered    DTAP Vaccine 2008, 2008, 2008, 09/01/2009    DTaP-IPV 04/12/2013    HIB Vaccine 2008, 2008, 2008, 09/01/2009    Hep A Vaccine 2 Dose Schedule (Ped/Adol) 07/15/2015, 04/11/2016    Hepatitis B Vaccine 2008, 2008, 2008    IPV 2008, 2008, 2008, 06/08/2009    Influenza Nasal Vaccine 12/30/2014    Influenza Nasal Vaccine (Quad) 12/23/2015    Influenza Vaccine (Quad) PF 10/26/2016    Influenza Vaccine Nasal 10/11/2011    MMR Vaccine 02/27/2009, 03/06/2012    Pneumococcal Vaccine (Pcv) 2008, 2008, 2008, 03/24/2011    Rotavirus Vaccine 2008, 2008, 2008    Varicella Virus Vaccine Live 02/27/2009, 03/06/2012     History of previous adverse reactions to immunizations:no    Current Issues:  Current concerns on the part of Carys mother include none. Toilet trained? yes  Concerns regarding hearing? no  Does pt snore? (Sleep apnea screening) no     Review of Nutrition:  Current dietary habits: appetite good    Social Screening:  Current child-care arrangements: in home: primary caregiver: mother  Parental coping and self-care: Doing well; no concerns. Opportunities for peer interaction? yes  Concerns regarding behavior with peers? no  School performance: Doing well; no concerns.   Secondhand smoke exposure?  no    Objective:     Visit Vitals    /65 (BP 1 Location: Left arm, BP Patient Position: Sitting)    Pulse 97    Temp 98.2 °F (36.8 °C) (Oral)    Ht (!) 4' 8\" (1.422 m)  Wt 102 lb (46.3 kg)    SpO2 99%    BMI 22.87 kg/m2     Growth parameters are noted and are appropriate for age. Vision screening done:no    General:  alert, cooperative, no distress, appears stated age   Gait:  normal   Skin:  no rashes, no ecchymoses, no petechiae, no nodules, no jaundice, no purpura, no wounds   Oral cavity:  Lips, mucosa, and tongue normal. Teeth and gums normal   Eyes:  sclerae white, pupils equal and reactive, red reflex normal bilaterally   Ears:  normal bilateral   Neck:  supple, symmetrical, trachea midline, no adenopathy, thyroid: not enlarged, symmetric, no tenderness/mass/nodules, no carotid bruit and no JVD   Lungs/Chest: clear to auscultation bilaterally   Heart:  regular rate and rhythm, S1, S2 normal, no murmur, click, rub or gallop   Abdomen: soft, non-tender. Bowel sounds normal. No masses,  no organomegaly   :    Extremities:  extremities normal, atraumatic, no cyanosis or edema   Neuro:  normal without focal findings  mental status, speech normal, alert and oriented x iii  RENETTA  reflexes normal and symmetric       Assessment:     Healthy 4  y.o. 2  m.o. old exam    Plan:     1. Anticipatory guidance:Gave handout on well-child issues at this age, importance of varied diet, minimize junk food, importance of regular dental care, reading together; Samantha Strajohnnye 19 card; limiting TV; media violence, car seat/seat belts; don't put in front seat of cars w/airbags;bicycle helmets, teaching child how to deal with strangers, skim or lowfat milk best, proper dental care    Verbal and written instructions (see AVS) provided. Patient expresses understanding of diagnosis and treatment plan. History of a stroke or TIA High blood pressure/History of a stroke or TIA

## 2020-02-18 NOTE — PROGRESS NOTE ADULT - SUBJECTIVE AND OBJECTIVE BOX
Neurology Follow up note    NICHOLAS [id number]    HPI:  79 y/o F with PMHx of insomnia, osteoporosis, migraines and arthritis presented to the ED with complaint of right arm numbness/tingling/transient paralysis, right facial weakness, and slurred speech. Patient and daughter state that patient was sitting at the table when her right arm began to feel numb, predominantly the right hand, and she started having tingling. Daughter states she then noted her right side of her face was drooping and she began to slur her speech. Daughter states the slurred speech and right facial droop lasted 3-5 minutes. The RUE symptoms lasted ~10 minutes. All symptoms occurred approx 30 minutes prior to ED presentation and all symptoms resolved at present. Patient admits to some disorientation to lights prior to emergence of any symptoms. Denies any similar prior episodes.     In the ED, VS significant for BP to 202/95.   Labs unremarkable.   CXR (compared to study from 5/2019) showed: previously reported nodular opacity within the medial aspect of the right lower lung field appears unchanged with an additional region of nodular opacity identified overlying the right upper lung field measuring approximately 1 cm. CT chest evaluation is recommended for further assessment. Chronic deformity of proximal right humerus.   CT head (stroke protocol) negative for acute hemorrhage or territorial infarct.   CTA Brain and Neck: No large vessel occlusion. No high-grade stenosis.  EKG: NSR 82bpm    S/p ASA 325mg (16 Feb 2020 18:42)      Interval History - did not sleep well    Patient is seen, chart was reviewed and case was discussed with the treatment team.  Pt is not in any distress.   Lying on bed comfortably.   No events reported overnight.   No clinical seizure was reported..    Vital Signs Last 24 Hrs  T(C): 36.7 (18 Feb 2020 07:17), Max: 36.9 (17 Feb 2020 12:47)  T(F): 98 (18 Feb 2020 07:17), Max: 98.5 (17 Feb 2020 12:47)  HR: 79 (18 Feb 2020 07:17) (78 - 92)  BP: 125/74 (18 Feb 2020 07:17) (124/71 - 155/72)  BP(mean): --  RR: 17 (18 Feb 2020 07:17) (16 - 18)  SpO2: 98% (18 Feb 2020 07:17) (97% - 99%)    REVIEW OF SYSTEMS:    Constitutional: No fever, weight loss or fatigue  Eyes: No eye pain, visual disturbances, or discharge  ENT:  No difficulty hearing, tinnitus, vertigo; No sinus or throat pain  Neck: No pain or stiffness  Respiratory: No cough, wheezing, chills or hemoptysis  Cardiovascular: No chest pain, palpitations, shortness of breath, dizziness or leg swelling  Gastrointestinal: No abdominal or epigastric pain. No nausea, vomiting or hematemesis; No diarrhea or constipation. No melena or hematochezia.  Genitourinary: No dysuria, frequency, hematuria or incontinence  Neurological: No headaches, memory loss, loss of strength, numbness or tremors  Psychiatric: No depression, anxiety, mood swings or difficulty sleeping  Musculoskeletal: No joint pain or swelling; No muscle, back or extremity pain  Skin: No itching, burning, rashes or lesions   Lymph Nodes: No enlarged glands  Endocrine: No heat or cold intolerance;     On Neurological Examination:    Mental Status - Pt is alert, awake, oriented X3. Higher functions are intact. Follows commands well and able to answer questions appropriately  .Mood and affect  normal    Speech -  Normal.     Cranial Nerves - Pupils 3 mm equal and reactive to light, extraocular eye movements intact. Pt has no visual field deficit.  Pt has no  facial asymmetry. Facial sensation is intact.Tongue - is in midline.    Muscle tone - is normal all over. Moves all extremities equally. No asymmetry is seen.      Motor Exam - 5/5 all over, No drift. No shaking or tremors.    Sensory Exam - Pin prick, temperature, joint position and vibration are intact on either side. Pt withdraws all extremities equally on stimulation. No asymmetry seen. No complaints of tingling, numbness.    Gait - Able to stand and walk unassisted.      coordination:    Finger to nose: normal  .    Deep tendon Reflexes - 2 plus all over.        Romberg - Negative.    Neck Supple -  Yes.     MEDICATIONS    amitriptyline 10 milliGRAM(s) Oral at bedtime  amLODIPine   Tablet 10 milliGRAM(s) Oral daily  aspirin 325 milliGRAM(s) Oral daily  atorvastatin 40 milliGRAM(s) Oral at bedtime  calcium carbonate 1250 mG  + Vitamin D (OsCal 500 + D) 1 Tablet(s) Oral daily  enoxaparin Injectable 40 milliGRAM(s) SubCutaneous at bedtime  hydrALAZINE Injectable 5 milliGRAM(s) IV Push every 6 hours PRN  melatonin 3 milliGRAM(s) Oral at bedtime      Allergies    No Known Allergies    Intolerances        LABS:  CBC Full  -  ( 17 Feb 2020 07:18 )  WBC Count : 6.80 K/uL  RBC Count : 4.22 M/uL  Hemoglobin : 11.9 g/dL  Hematocrit : 36.7 %  Platelet Count - Automated : 273 K/uL  Mean Cell Volume : 87.0 fl      02-17    140  |  105  |  16  ----------------------------<  95  3.7   |  29  |  0.83    Ca    9.3      17 Feb 2020 07:18  Phos  3.3     02-17  Mg     2.2     02-17    TPro  7.8  /  Alb  3.4  /  TBili  0.4  /  DBili  x   /  AST  16  /  ALT  17  /  AlkPhos  98  02-17    Hemoglobin A1C: Hemoglobin A1C, Whole Blood: 5.8 % (02-17 @ 08:14)    Lipid Panel 02-17 @ 10:26  Total Cholesterol, Serum 167  LDL 94  Triglycerides 57    Vitamin B12 Vitamin B12, Serum: 421 pg/mL (02-17 @ 10:26)      RADIOLOGY    ASSESSMENT AND PLAN:     presented slurred speech, right facial weakness and arm numbness-   likely TIA  BRAIN MRI - NO ACUTE CVA  HTN    CONSIDER OUT PATIENT HOLTER/ILR  CONTINUE AP/STATIN  STARTED ON AMLODIPINE  PLAN OF MANAGEMENT  DW PATIENT AND HER DAUGHTER  Physical therapy evaluation.  Pain is accessed and addressed.  Plan of care was discussed with family. Questions answered.  Would continue to follow. [see HPI] : see HPI [Negative] : Gastrointestinal

## 2020-02-18 NOTE — PHARMACOTHERAPY INTERVENTION NOTE - COMMENTS
Counseled patient regarding new discharge medications, aspirin, atorvastatin, amlodipine uses and side effects.

## 2020-02-18 NOTE — DISCHARGE NOTE PROVIDER - CARE PROVIDER_API CALL
Lior,   Phone: (   )    -  Fax: (   )    -  Follow Up Time: Latasha Tinajero  [address] [place] [address]  Phone: [phone]  Fax: (   )    -  Follow Up Time:

## 2020-02-18 NOTE — PROGRESS NOTE ADULT - ASSESSMENT
79 y/o F with PMHx of migraines, osteoporosis, arthritis, insomnia presented to the ED with complaint of right arm numbness, facial weakness and slurred speech lasting 5 minutes, with complete resolution of symptoms admitted for TIA, rule out CVA.

## 2020-02-18 NOTE — PROGRESS NOTE ADULT - ASSESSMENT
80 year old female with insomnia and arthritis, who presents with arm and facial numbness, suggestive of a TIA.  She has no significant cardiac history and no symptoms suggestive of Paroxysmal atrial fibrillation in the past.    - symptoms consistent with TIA. MRI w/o evidence of acute ischemia  - remains in a sr on telemetry. No atrial arrythmia noted to date. Continue to watch.  - echocardiogram nL LV & RV size and function no significant valvular dysfunction EF 55-60% Stage I DD  - discussed the fact that a cryptogenic TIA could be indicative of AF, and she will need longer term monitoring  - cont asa and statin  - CW amlodipine 10 mg daily  - neuro following    - no sign of acute ischemia  - no sign of volume overload    - watch creatinine and electrolytes. Keep K>4, mg>2  - will follow with you  Juve Emery [facility]  Cardiology   Spectra #[phone]

## 2020-02-18 NOTE — DISCHARGE NOTE NURSING/CASE MANAGEMENT/SOCIAL WORK - NSDCPEPTSTRK_GEN_ALL_CORE
Prescribed medications/Signs and symptoms of stroke/Stroke support groups for patients, families, and friends/Risk factors for stroke/Stroke warning signs and symptoms/Call 911 for stroke/Stroke education booklet/Need for follow up after discharge

## 2020-02-18 NOTE — DISCHARGE NOTE PROVIDER - PROVIDER TOKENS
FREE:[LAST:[Lior],PHONE:[(   )    -],FAX:[(   )    -]] FREE:[LAST:[Lior],FIRST:[Latasha],PHONE:[phone]],FAX:[(   )    -],ADDRESS:[[address] [place] [id number]]]

## 2020-02-18 NOTE — PROGRESS NOTE ADULT - SUBJECTIVE AND OBJECTIVE BOX
Patient is a 80y old  Female who presents with a chief complaint of right arm numbness, facial weakness and slurred speech (18 Feb 2020 07:18)      FROM ADMISSION H+P:   HPI:  79 y/o F with PMHx of insomnia, osteoporosis, migraines and arthritis presented to the ED with complaint of right arm numbness/tingling/transient paralysis, right facial weakness, and slurred speech. Patient and daughter state that patient was sitting at the table when her right arm began to feel numb, predominantly the right hand, and she started having tingling. Daughter states she then noted her right side of her face was drooping and she began to slur her speech. Daughter states the slurred speech and right facial droop lasted 3-5 minutes. The RUE symptoms lasted ~10 minutes. All symptoms occurred approx 30 minutes prior to ED presentation and all symptoms resolved at present. Patient admits to some disorientation to lights prior to emergence of any symptoms. Denies any similar prior episodes.     In the ED, VS significant for BP to 202/95.   Labs unremarkable.   CXR (compared to study from 5/2019) showed: previously reported nodular opacity within the medial aspect of the right lower lung field appears unchanged with an additional region of nodular opacity identified overlying the right upper lung field measuring approximately 1 cm. CT chest evaluation is recommended for further assessment. Chronic deformity of proximal right humerus.   CT head (stroke protocol) negative for acute hemorrhage or territorial infarct.   CTA Brain and Neck: No large vessel occlusion. No high-grade stenosis.  EKG: NSR 82bpm    S/p ASA 325mg (16 Feb 2020 18:42)      ----  INTERVAL HPI/OVERNIGHT EVENTS: Pt seen and evaluated at the bedside. No acute overnight events occurred.     ----  PAST MEDICAL & SURGICAL HISTORY:  Arthritis  Osteoporosis  Insomnia  No significant past surgical history      FAMILY HISTORY:  FH: ovarian cancer  FH: lung cancer  FHx: asthma      Allergies    No Known Allergies    Intolerances        ----  REVIEW OF SYSTEMS:  CONSTITUTIONAL: denies fever, chills, fatigue, weakness  HEENT: denies blurred vision, sore throat  SKIN: denies new lesions, rash  CARDIOVASCULAR: denies chest pain, chest pressure, palpitations  RESPIRATORY: denies shortness of breath, sputum production  GASTROINTESTINAL: denies nausea, vomiting, diarrhea, abdominal pain  GENITOURINARY: denies dysuria, discharge  NEUROLOGICAL: denies numbness, headache, focal weakness  MUSCULOSKELETAL: denies new joint pain, muscle aches  HEMATOLOGIC: denies gross bleeding, bruising  LYMPHATICS: denies enlarged lymph nodes, extremity swelling  PSYCHIATRIC: denies recent changes in anxiety, depression  ENDOCRINOLOGIC: denies sweating, cold or heat intolerance    ----  PHYSICAL EXAM:  GENERAL: patient appears well, no acute distress, appropriately interactive  EYES: sclera clear, no exudates  ENMT: oropharynx clear without erythema, moist mucous membranes  NECK: supple, soft  LUNGS: good air entry bilaterally, clear to auscultation, symmetric breath sounds, no wheezing or rhonchi appreciated  HEART: soft S1/S2, regular rate and rhythm, no murmurs noted, no noted edema to b/l LE  GASTROINTESTINAL: abdomen is soft, nontender, nondistended, normoactive bowel sounds, no palpable masses  INTEGUMENT: good skin turgor, appropriate for ethnicity, appears well perfused, no jaundice noted  MUSCULOSKELETAL: no clubbing or cyanosis, no obvious deformity  NEUROLOGIC: awake, alert, oriented x3, good muscle tone in 4 extremities, no obvious sensory deficits  PSYCHIATRIC: mood is good, affect is congruent with mood, linear and logical thought process  HEME/LYMPH: no obvious ecchymosis     T(C): 36.7 (02-18-20 @ 07:17), Max: 36.9 (02-17-20 @ 12:47)  HR: 79 (02-18-20 @ 07:17) (78 - 92)  BP: 125/74 (02-18-20 @ 07:17) (124/71 - 155/72)  RR: 17 (02-18-20 @ 07:17) (16 - 18)  SpO2: 98% (02-18-20 @ 07:17) (97% - 99%)  Wt(kg): --    ----  I&O's Summary      LABS:                        12.0   7.14  )-----------( 280      ( 18 Feb 2020 06:52 )             37.9     02-18    141  |  106  |  17  ----------------------------<  93  4.2   |  30  |  0.85    Ca    9.3      18 Feb 2020 06:52  Phos  3.3     02-17  Mg     2.2     02-17    TPro  7.8  /  Alb  3.4  /  TBili  0.4  /  DBili  x   /  AST  16  /  ALT  17  /  AlkPhos  98  02-17    PT/INR - ( 16 Feb 2020 16:57 )   PT: 12.4 sec;   INR: 1.10 ratio         PTT - ( 16 Feb 2020 16:57 )  PTT:32.8 sec    CAPILLARY BLOOD GLUCOSE                    ----  Personally reviewed:  Vital sign trends: [ x ] yes    [  ] no     [  ] n/a  Laboratory results: [ x ] yes    [  ] no     [  ] n/a  Radiology results: [  ] yes    [  ] no     [  ] n/a  Culture results: [  ] yes    [  ] no     [  ] n/a  Consultant recommendations: [  ] yes    [  ] no     [  ] n/a

## 2020-02-18 NOTE — PROGRESS NOTE ADULT - PROBLEM SELECTOR PLAN 1
pulm nodule - ct chest reviewed from [facility] - sub cm nodules - no intervention and low suspicion for Cancer / Malignancy  pt follows with Pulm Medicine Team at [facility]  will follow up for Surveillance work up and management of Pulm Nodules  Non Smoker  no Acute CVA - Neuro follow up reviewed  Cardio assessment noted  will follow  dc planning   cvs rx regimen and BP rx regimen optimization
No
TIA vs r/o CVA with resolved slurred speech, RUE weakness/numbness/tingling, and right facial droop  - Monitor on  telemetry  - S/p ASA 325mg x1 in ED  - continue aspirin and statin daily   - Continue permissive HTN as per neuro, Hydralazine 5mg IVP Q6H PRN for SBP >200, monitor hemodynamics  - CT Head and CTA Brain/Neck negative for acute pathology  - Aspiration precautions, passed bedside swallow in ED, will start diet  - Fall precautions  - F/u MRI Brain (will premedicate with low dose xanax due to claustrophobia)  - F/u TTE results  - F/u Lipid panel, HbA1c, TSH, B12, folate levels  - Neurochecks Q4H  - Neuro Dr. Hendrickson following,
TIA vs r/o CVA with resolved slurred speech, RUE weakness/numbness/tingling, and right facial droop  - Monitor on  telemetry  - S/p ASA 325mg x1 in ED  - continue aspirin and statin daily   - Continue permissive HTN as per neuro, Hydralazine 5mg IVP Q6H PRN for SBP >200, monitor hemodynamics  - CT Head and CTA Brain/Neck negative for acute pathology  - Aspiration precautions, passed bedside swallow in ED, will start diet  - Fall precautions  - F/u MRI Brain (will premedicate with low dose xanax due to claustrophobia)  - F/u TTE results  - F/u Lipid panel, HbA1c, TSH, B12, folate levels  - Neurochecks Q4H  - Neuro Dr. Hendrickson following,

## 2020-02-18 NOTE — PROGRESS NOTE ADULT - SUBJECTIVE AND OBJECTIVE BOX
Date/Time Patient Seen:  		  Referring MD:   Data Reviewed	       Patient is a 80y old  Female who presents with a chief complaint of right arm numbness, facial weakness and slurred speech (17 Feb 2020 16:10)      Subjective/HPI     PAST MEDICAL & SURGICAL HISTORY:  Arthritis  Osteoporosis  Insomnia  Spastic colon  No pertinent past medical history  No significant past surgical history        Medication list         MEDICATIONS  (STANDING):  amitriptyline 10 milliGRAM(s) Oral at bedtime  amLODIPine   Tablet 10 milliGRAM(s) Oral daily  aspirin 325 milliGRAM(s) Oral daily  atorvastatin 40 milliGRAM(s) Oral at bedtime  calcium carbonate 1250 mG  + Vitamin D (OsCal 500 + D) 1 Tablet(s) Oral daily  enoxaparin Injectable 40 milliGRAM(s) SubCutaneous at bedtime  melatonin 3 milliGRAM(s) Oral at bedtime    MEDICATIONS  (PRN):  hydrALAZINE Injectable 5 milliGRAM(s) IV Push every 6 hours PRN sbp >200         Vitals log        ICU Vital Signs Last 24 Hrs  T(C): 36.7 (18 Feb 2020 07:17), Max: 36.9 (17 Feb 2020 12:47)  T(F): 98 (18 Feb 2020 07:17), Max: 98.5 (17 Feb 2020 12:47)  HR: 79 (18 Feb 2020 07:17) (78 - 92)  BP: 125/74 (18 Feb 2020 07:17) (124/71 - 155/72)  BP(mean): --  ABP: --  ABP(mean): --  RR: 17 (18 Feb 2020 07:17) (16 - 18)  SpO2: 98% (18 Feb 2020 07:17) (97% - 99%)           Input and Output:  I&O's Detail      Lab Data                        12.0   7.14  )-----------( 280      ( 18 Feb 2020 06:52 )             37.9     02-17    140  |  105  |  16  ----------------------------<  95  3.7   |  29  |  0.83    Ca    9.3      17 Feb 2020 07:18  Phos  3.3     02-17  Mg     2.2     02-17    TPro  7.8  /  Alb  3.4  /  TBili  0.4  /  DBili  x   /  AST  16  /  ALT  17  /  AlkPhos  98  02-17            Review of Systems	      Objective     Physical Examination    heart s1s2  lung dec BS  abd soft  head nc  head at  on room air      Pertinent Lab findings & Imaging      Blevins:  NO   Adequate UO     I&O's Detail           Discussed with:     Cultures:	        Radiology

## 2020-02-18 NOTE — DISCHARGE NOTE NURSING/CASE MANAGEMENT/SOCIAL WORK - PATIENT PORTAL LINK FT
You can access the FollowMyHealth Patient Portal offered by [facility] by registering at the following website: [email or web address]. By joining [employer]’s FollowMyHealth portal, you will also be able to view your health information using other applications (apps) compatible with our system.

## 2020-02-18 NOTE — DISCHARGE NOTE PROVIDER - NSDCMRMEDTOKEN_GEN_ALL_CORE_FT
Adult Aspirin 325 mg oral tablet: 1 tab(s) orally once a day  amitriptyline 10 mg oral tablet: 1 tab(s) orally once a day (at bedtime)  Caltrate 600 + D oral tablet: 1 tab(s) orally 2 times a day  Lipitor 40 mg oral tablet: 1 tab(s) orally once a day (at bedtime)  Melatonin 3 mg oral tablet: 1 tab(s) orally once a day (at bedtime)  naproxen 500 mg oral tablet: 1 tab(s) orally 2 times a day, As Needed  Norvasc 10 mg oral tablet: 1 tab(s) orally once a day  risedronate 150 mg oral tablet: 1 tab(s) orally once a month

## 2020-02-18 NOTE — DISCHARGE NOTE PROVIDER - HOSPITAL COURSE
ADMISSION H+P:        HPI:    81 y/o F with PMHx of insomnia, osteoporosis, migraines and arthritis presented to the ED with complaint of right arm numbness/tingling/transient paralysis, right facial weakness, and slurred speech. Patient and daughter state that patient was sitting at the table when her right arm began to feel numb, predominantly the right hand, and she started having tingling. Daughter states she then noted her right side of her face was drooping and she began to slur her speech. Daughter states the slurred speech and right facial droop lasted 3-5 minutes. The RUE symptoms lasted ~10 minutes. All symptoms occurred approx 30 minutes prior to ED presentation and all symptoms resolved at present. Patient admits to some disorientation to lights prior to emergence of any symptoms. Denies any similar prior episodes.         In the ED, VS significant for BP to 202/95.     Labs unremarkable.     CXR (compared to study from 5/2019) showed: previously reported nodular opacity within the medial aspect of the right lower lung field appears unchanged with an additional region of nodular opacity identified overlying the right upper lung field measuring approximately 1 cm. CT chest evaluation is recommended for further assessment. Chronic deformity of proximal right humerus.     CT head (stroke protocol) negative for acute hemorrhage or territorial infarct.     CTA Brain and Neck: No large vessel occlusion. No high-grade stenosis.    EKG: NSR 82bpm        S/p ASA 325mg (16 Feb 2020 18:42)            ---    HOSPITAL COURSE: Patient was admitted to telemetry for TIA vs r/o CVA. Cardiology ([facility]) was consulted. Patient was started on Aspirin 81 mg qd, Lipitor 40 mg qd, and Norvasc 10 mg qd. Neurology (Dr. Hendrickson) was consulted. CT Head and CTA Brain/Neck was negative for acute pathology. MRI Brain was negative for any acute ischemic changes. TTE showed ----. CXR showed previously reported nodular opacity within the medial aspect of the right lower lung field appears unchanged. There is an additional region of nodular opacity identified overlying the right upper lung field measuring approximately 1 cm. Pulmonology (Dr. Azul) was consulted. CT chest was reviewed from [facility] and revealed sub cm nodules, suggesting low suspicion for cancer/malignancy and requiring no intervention at this time. Patient will follow up with pulmonary medicine team at [facility]. Patient was medically optimized and improved clinically throughout hospital course. Patient seen and examined on day of discharge.        Vital Signs    T(C): 36.7 (18 Feb 2020 07:17), Max: 36.9 (17 Feb 2020 12:47)    T(F): 98 (18 Feb 2020 07:17), Max: 98.5 (17 Feb 2020 12:47)    HR: 79 (18 Feb 2020 07:17) (78 - 92)    BP: 125/74 (18 Feb 2020 07:17) (124/71 - 155/72)    RR: 17 (18 Feb 2020 07:17) (16 - 18)    SpO2: 98% (18 Feb 2020 07:17) (97% - 99%)        Physical Exam:    General: well-developed, well-nourished, NAD    HEENT: normocephalic, atraumatic, EOMI, moist mucous membranes     Neck: supple, non-tender, no masses    Neurology: AAOx3, sensation intact    Respiratory: clear to auscultation bilaterally; no wheezes, rhonchi, or rales    CV: regular rate and rhythm, soft S1/S2, no murmurs, rubs, or gallops    Abdominal: soft, non-tender, non-distended, bowel sounds present    Extremities: no clubbing, cyanosis, or edema; palpable peripheral pulses    Musculoskeletal: no joint erythema or warmth, no joint swelling     Skin: warm, dry, normal color        Patient is medically stable for discharge to home with outpatient follow up.    ---    CONSULTANTS:     Pulmonology (Dr. Azul)     Cardiology ([facility])    Neurology (Dr. Hendrickson)    ---    FINAL DISCHARGE DIAGNOSIS LIST:    Please see last daily progress note for final discharge diagnoses ADMISSION H+P:        HPI:    79 y/o F with PMHx of insomnia, osteoporosis, migraines and arthritis presented to the ED with complaint of right arm numbness/tingling/transient paralysis, right facial weakness, and slurred speech. Patient and daughter state that patient was sitting at the table when her right arm began to feel numb, predominantly the right hand, and she started having tingling. Daughter states she then noted her right side of her face was drooping and she began to slur her speech. Daughter states the slurred speech and right facial droop lasted 3-5 minutes. The RUE symptoms lasted ~10 minutes. All symptoms occurred approx 30 minutes prior to ED presentation and all symptoms resolved at present. Patient admits to some disorientation to lights prior to emergence of any symptoms. Denies any similar prior episodes.         In the ED, VS significant for BP to 202/95.     Labs unremarkable.     CXR (compared to study from 5/2019) showed: previously reported nodular opacity within the medial aspect of the right lower lung field appears unchanged with an additional region of nodular opacity identified overlying the right upper lung field measuring approximately 1 cm. CT chest evaluation is recommended for further assessment. Chronic deformity of proximal right humerus.     CT head (stroke protocol) negative for acute hemorrhage or territorial infarct.     CTA Brain and Neck: No large vessel occlusion. No high-grade stenosis.    EKG: NSR 82bpm        S/p ASA 325mg (16 Feb 2020 18:42)            ---    HOSPITAL COURSE: Patient was admitted to telemetry for TIA vs r/o CVA. Cardiology ([facility]) was consulted. Patient was started on Aspirin 81 mg qd, Lipitor 40 mg qd, and Norvasc 10 mg qd. Neurology (Dr. Hendrickson) was consulted. CT Head and CTA Brain/Neck was negative for acute pathology. MRI Brain was negative for any acute ischemic changes. TTE showed ----. CXR showed previously reported nodular opacity within the medial aspect of the right lower lung field appears unchanged. There is an additional region of nodular opacity identified overlying the right upper lung field measuring approximately 1 cm. Pulmonology (Dr. Azul) was consulted. CT chest was reviewed from [facility] and revealed sub cm nodules, suggesting low suspicion for cancer/malignancy and requiring no intervention at this time. Patient will follow up with pulmonary medicine team at [facility]. Patient was medically optimized and improved clinically throughout hospital course. Patient seen and examined on day of discharge.        Vital Signs    T(C): 36.7 (18 Feb 2020 07:17), Max: 36.9 (17 Feb 2020 12:47)    T(F): 98 (18 Feb 2020 07:17), Max: 98.5 (17 Feb 2020 12:47)    HR: 79 (18 Feb 2020 07:17) (78 - 92)    BP: 125/74 (18 Feb 2020 07:17) (124/71 - 155/72)    RR: 17 (18 Feb 2020 07:17) (16 - 18)    SpO2: 98% (18 Feb 2020 07:17) (97% - 99%)        Physical Exam:    General: well-developed, well-nourished, NAD    HEENT: normocephalic, atraumatic, EOMI, moist mucous membranes     Neck: supple, non-tender, no masses    Neurology: AAOx3, sensation intact    Respiratory: clear to auscultation bilaterally; no wheezes, rhonchi, or rales    CV: regular rate and rhythm, soft S1/S2, no murmurs, rubs, or gallops    Abdominal: soft, non-tender, non-distended, bowel sounds present    Extremities: no clubbing, cyanosis, or edema; palpable peripheral pulses    Musculoskeletal: no joint erythema or warmth, no joint swelling     Skin: warm, dry, normal color        Patient is medically stable for discharge to home with outpatient follow up.    ---    CONSULTANTS:     Pulmonology (Dr. Azul)     Cardiology ([facility])    Neurology (Dr. Hendrickson)    ---    FINAL DISCHARGE DIAGNOSIS LIST:    Please see last daily progress note for final discharge diagnoses        time spent more than 33 minutes ADMISSION H+P:        HPI:    81 y/o F with PMHx of insomnia, osteoporosis, migraines and arthritis presented to the ED with complaint of right arm numbness/tingling/transient paralysis, right facial weakness, and slurred speech. Patient and daughter state that patient was sitting at the table when her right arm began to feel numb, predominantly the right hand, and she started having tingling. Daughter states she then noted her right side of her face was drooping and she began to slur her speech. Daughter states the slurred speech and right facial droop lasted 3-5 minutes. The RUE symptoms lasted ~10 minutes. All symptoms occurred approx 30 minutes prior to ED presentation and all symptoms resolved at present. Patient admits to some disorientation to lights prior to emergence of any symptoms. Denies any similar prior episodes.         In the ED, VS significant for BP to 202/95.     Labs unremarkable.     CXR (compared to study from 5/2019) showed: previously reported nodular opacity within the medial aspect of the right lower lung field appears unchanged with an additional region of nodular opacity identified overlying the right upper lung field measuring approximately 1 cm. CT chest evaluation is recommended for further assessment. Chronic deformity of proximal right humerus.     CT head (stroke protocol) negative for acute hemorrhage or territorial infarct.     CTA Brain and Neck: No large vessel occlusion. No high-grade stenosis.    EKG: NSR 82bpm        S/p ASA 325mg (16 Feb 2020 18:42)            ---    HOSPITAL COURSE: Patient was admitted to telemetry for TIA vs r/o CVA. Cardiology ([facility]) was consulted. Patient was started on Aspirin 81 mg qd, Lipitor 40 mg qd, and Norvasc 10 mg qd. Neurology (Dr. Hendrickson) was consulted. CT Head and CTA Brain/Neck was negative for acute pathology. MRI Brain was negative for any acute ischemic changes. TTE showed LVEF 55-60%, trace TR, trace MR. CXR showed previously reported nodular opacity within the medial aspect of the right lower lung field appears unchanged. There is an additional region of nodular opacity identified overlying the right upper lung field measuring approximately 1 cm. Pulmonology (Dr. Azul) was consulted. CT chest was reviewed from [facility] and revealed sub cm nodules, suggesting low suspicion for cancer/malignancy and requiring no intervention at this time. Patient will follow up with pulmonary medicine team at [facility]. Patient was medically optimized and improved clinically throughout hospital course. Patient seen and examined on day of discharge.        Vital Signs    T(C): 36.7 (18 Feb 2020 07:17), Max: 36.9 (17 Feb 2020 12:47)    T(F): 98 (18 Feb 2020 07:17), Max: 98.5 (17 Feb 2020 12:47)    HR: 79 (18 Feb 2020 07:17) (78 - 92)    BP: 125/74 (18 Feb 2020 07:17) (124/71 - 155/72)    RR: 17 (18 Feb 2020 07:17) (16 - 18)    SpO2: 98% (18 Feb 2020 07:17) (97% - 99%)        Physical Exam:    General: well-developed, well-nourished, NAD    HEENT: normocephalic, atraumatic, EOMI, moist mucous membranes     Neck: supple, non-tender, no masses    Neurology: AAOx3, sensation intact    Respiratory: clear to auscultation bilaterally; no wheezes, rhonchi, or rales    CV: regular rate and rhythm, soft S1/S2, no murmurs, rubs, or gallops    Abdominal: soft, non-tender, non-distended, bowel sounds present    Extremities: no clubbing, cyanosis, or edema; palpable peripheral pulses    Musculoskeletal: no joint erythema or warmth, no joint swelling     Skin: warm, dry, normal color        Patient is medically stable for discharge to home with outpatient follow up.    ---    CONSULTANTS:     Pulmonology (Dr. Azul)     Cardiology ([facility])    Neurology (Dr. Hendrickson)    ---    FINAL DISCHARGE DIAGNOSIS LIST:    Please see last daily progress note for final discharge diagnoses        time spent more than 33 minutes

## 2020-09-23 NOTE — PATIENT PROFILE ADULT - ARE ANY OF THE ITEMS ON THE CHART
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 Patient reports competing HEP to best of her ability, but pain limits all activity.   Functional Change: No positive changes when asked.   Current functional limitations: Uncontrolled pain in bilateral UE, decreased activity tolerance, loss of shoulder/ elbow/ wrist ROM    Pain / Symptoms:  Pain/symptom is: constant  Pain rating (out of 10): Current: 6 ; Worst: 10  Location: Bilateral UE, right small finger is most painful   Quality / Description: ache. Denies paraesthesias   Alleviating Factors: unable to state anything that helps reduce the pain.   Progression since onset: worsening    OBJECTIVE                                                                                                                     Observation:   Seated Posture: poor  Standing Posture: poor  Correction of posture: has no consistent effect  Comments / Details: Pain with palpation to right digiti minimi and small finger extrinsic extensors. Unable to tolerate small finger resistance abduction and extension.     Range of Motion (ROM)   (degrees unless noted; active unless noted; norms in ( ); negative=lacking to 0, positive=beyond 0)   Shoulder:     - Flexion (180):        • Left: 110         • Right: 105     - Extension (50):        • Left: 40         • Right: 37     - Abduction (180):         • Left: 122         • Right: 87    - Internal Rotation at 45°:         • Left: 32        • Right: 32    - External Rotation at 45°:         • Left: 78        • Right: 60  Elbow/Forearm:     - Flexion (140-150):        • Left: 105         • Right: 132    - Extension (0; ):        • Left: -7        • Right: -40    - Supination (80):        • Left: 60        • Right: 75    - Pronation (80):        • Left: 90        • Right: 90  Wrist:    - Flexion (60-80):        • Left:  50        • Right:  50    - Extension (60-70):        • Left: 35        • Right: 20    - Radial Deviation (20):        • Left: 14        • Right: 12    - Ulnar Deviation (30):        • Left: 15        • Right: 17  Details / Comments: Able to make full bilateral composite fist, loose.     Strength  (out of 5 unless noted, standard test position unless noted, lbs tested with hand held dynamometer)   : (lbs)    - Neutral:        • Left: Trial(s): 5, 5, 5, Average: 5        • Right: Trial(s): 0, 5, 0, Average: 1.67  Pinch: (lbs)    - Lateral:         • Left: Trial(s): 0, 0, 0, Average: 0        • Right: Trial(s): 1, 0, 0, Average: 0.33    - 3 Point:         • Left: Trial(s) 0, 0, 0, Average: 0        • Right: Trial(s): 0, 0, 0, Average: 0  Comments / Details: Right gross grasp unable to hold dynameter, needed to place right hand on lap        TREATMENT                                                                                                                  Manual Therapy:  Soft tissue manual mobilization to right forearm/ ulnar side and gentle MP/ PIP flexion     Therapeutic Activity:  Therapy organized most pertinent exercise program based on current strength/ ROM deficits. Discussed gross grasp and pinches with red sponge (had blue: too much resistance) as well as home putty for intrinsic strength to be completed most days of the week. Shoulder A/AROM and stretching should be completed daily.    Discussed seated positioning in recliner with  laptop use. Patient does not have a desk or kitchen table. Encouraged frequent position breaks and mindfulness with posture. Encouraged positioning with pillow to decrease overuse of ulnar deviation at wrist with pillow or try to be more upright vs. Reclined position.     Skilled input: verbal instruction/cues and tactile instruction/cues    Writer verbally educated and received verbal consent for hand placement, positioning of patient, and techniques to be performed today from patient     Home Exercise Program: Cane shoulder A/AROM shoulder horizontal abduction/ adduction, shoulder flexion.   Upper trapezius stretch, built up foam piece for pen  Putty HEP: Gross hand and wrist movement, gross finger flexion, isolated opposition, gross opposition, thumb flexion, isolated finger flexion, finger adduction, finger abduction, wrist flexion, wrist extension.   Digit MP extension isometrics   PIP flexion isometrics active blocked  DIP extension isometrics (Profundus)  Sternocleidomastoid stretch  8/25/2020: Intrinsic hand strength: (1) rubber band gross intrinsic action, mass finger extension, individual intrinsic action, individual digit extension  Belsos with blue sponge  A/AROM table slides: flexion and scaption  AROM protraction and retraction     ASSESSMENT                                                                                                             Decreased activity tolerance and pain since lapse in care. Would like to resume therapeutic initiative for more aggressive care with light strength and activity tolerance. Provided ice at end of care.   Pain/symptoms after session: 6    : fair due to; positive factors: motivation level; negative factors: co-morbidities, multiple surgeries and lack of change in symptoms since onset  To date the patient has made gains not as expected due to Pain, decreased activity tolerance, loss of UE motion, kyphotic posturing, poor positioning with habitual activities as  reported. Patient continues to have impairments and functional deficits as noted.  Patient will continue to benefit from skilled care as outlined.  Patient Education:   Who will be receiving education: patient  Are they ready to learn: yes  Preferred learning style: written and verbal  Results of above outlined education: Verbalizes understanding and Demonstrates understanding      PLAN                                                                                                                         The following skilled interventions to be implemented to achieve goals listed below:  Manual Therapy (86908)  Therapeutic Activity (48476)  Therapeutic Exercise (42740)  Fluidotherapy/Whirlpool (09515/52314)  Heat/Cold (37637)  Splinting/Orthotics    Updates to plan of care: extend current plan of care    Frequency / Duration: 2 times per week tapering as patient progresses  for additional 8 weeks    Patient involved in and agreed to plan of care and goals.  Patient given attendance policy at time of initial evaluation.       Procedures and total treatment time documented Time Entry flowsheet.   no

## 2021-04-19 NOTE — ED PROVIDER NOTE - NIH STROKE SCALE: 2. BEST GAZE
Recent PHQ 2/9 Score    PHQ 2:  Date Adult PHQ 2 Score Adult PHQ 2 Interpretation   4/19/2021 1 No further screening needed       PHQ 9:      (0) Normal

## 2021-08-25 ENCOUNTER — TRANSCRIPTION ENCOUNTER (OUTPATIENT)
Age: 81
End: 2021-08-25

## 2021-08-30 PROBLEM — G47.00 INSOMNIA, UNSPECIFIED: Chronic | Status: ACTIVE | Noted: 2020-02-16

## 2021-08-30 PROBLEM — M81.0 AGE-RELATED OSTEOPOROSIS WITHOUT CURRENT PATHOLOGICAL FRACTURE: Chronic | Status: ACTIVE | Noted: 2020-02-16

## 2021-08-30 PROBLEM — M19.90 UNSPECIFIED OSTEOARTHRITIS, UNSPECIFIED SITE: Chronic | Status: ACTIVE | Noted: 2020-02-16

## 2021-08-31 ENCOUNTER — APPOINTMENT (OUTPATIENT)
Dept: ORTHOPEDIC SURGERY | Facility: CLINIC | Age: 81
End: 2021-08-31

## 2021-08-31 PROBLEM — Z00.00 ENCOUNTER FOR PREVENTIVE HEALTH EXAMINATION: Status: ACTIVE | Noted: 2021-08-31

## 2021-09-03 ENCOUNTER — APPOINTMENT (OUTPATIENT)
Dept: ORTHOPEDIC SURGERY | Facility: CLINIC | Age: 81
End: 2021-09-03
Payer: MEDICARE

## 2021-09-03 VITALS — BODY MASS INDEX: 21.86 KG/M2 | HEIGHT: 66 IN | WEIGHT: 136 LBS

## 2021-09-03 DIAGNOSIS — M17.11 UNILATERAL PRIMARY OSTEOARTHRITIS, RIGHT KNEE: ICD-10-CM

## 2021-09-03 PROCEDURE — 99203 OFFICE O/P NEW LOW 30 MIN: CPT

## 2021-09-07 PROBLEM — M17.11 PRIMARY LOCALIZED OSTEOARTHRITIS OF RIGHT KNEE: Status: ACTIVE | Noted: 2021-09-07

## 2021-09-07 NOTE — HISTORY OF PRESENT ILLNESS
[de-identified] : 80 yo F presents for initial valuation right knee pain x 10 days.  She was cleaning in the floor on her knees and had severe pain when she attempted to stand up.  Pain is generalized and intermittent, graded as 5/10.  She was evaluated at [facility], xrays done negative for fracture, mild OA.  She has been taking Tylenol as needed. with good relief.  She is ambulating with a walker.   PMHX TIA 1.5 yrs ago.  ASA 81 mg daily.   [2] : a minimum pain level of 2/10 [Acetaminophen] : not relieved by acetaminophen [Exercise Regimen] : not relieved by exercise regimen [NSAIDs] : not relieved by nonsteroidal anti-inflammatory drugs

## 2021-09-07 NOTE — DISCUSSION/SUMMARY
[de-identified] : Mild to moderate arthritis we will try conservative treatment NSAIDs therapy strengthening follow-up in 6 months

## 2021-09-07 NOTE — PHYSICAL EXAM
[Antalgic] : antalgic [Normal RUE] : Right Upper Extremity: No scars, rashes, lesions, ulcers, skin intact [Normal] : No costovertebral angle tenderness and no spinal tenderness [de-identified] : Patient appears stated age in no acute respiratory distress. Patient is alert oriented x3. Patient has normal mood and affect. \par Bilateral knee exam\par Range of motion of the knee is 0-120°. \par Skin is normal.  No rash.\par There is no effusion. No medial or lateral joint line tenderness. No swelling, no pitting edema.\par Overall alignment of the knee is then slight varus. Good anterior posterior stability. Firm endpoints on anterior and posterior drawer. \par Medial lateral stability is intact. Firm endpoint on medial and lateral stress testing .\par Rodney test is negative.\par Quadriceps strength 5/ 5. There is no loss of muscle volume in the thigh. \par Good anterior posterior and mediolateral stability.\par Sensation in the extremities intact. \par Discrimination is intact. Good DP and PT pulses.\par 		\par \par Bilateral hip exam\par On inspection of the hip shows skin is normal. No evidence of rash. \par No loss of muscle.  Abductor strength is 5 out of 5. Hip flexor strength is 5.\par Range of motion of the hip at 90° flexion internal rotation is 15° external rotation is 30° pain-free. \par Hip has good stability in anterior and posterior direction. \par On lateral decubitus  examination there is no tenderness in the greater trochanter. \par Lower Extremity Examination \par Bilateral lower extremity skin is normal. There is no rash. There is no edema and lymphadenopathy.  DP and PT pulses intact. Sensation is intact. [de-identified] : X-rays of the knee 2 views from the outside place shows no gross fractures.  Mild to moderate to moderate arthritis

## 2021-09-14 NOTE — PROGRESS NOTE ADULT - SUBJECTIVE AND OBJECTIVE BOX
Impression: Vitreous hemorrhage, right eye: H43.11. Right. Plan: Recent onset VH OD. Patient states vision has improved since onset of VH. VH now is mild with most hemorrhage layered inferiorly in periphery. Discussed treatment options (Avastin, PRP, PPV). Since VH is clearing, no treatment is recommended at this time.   

Return in 1 year, OCT [facility] -- Luis Landis, Trinity, Jerzy, Rl, Vy Naidu  Office # [phone]      Follow Up:    CVA/TIA  Subjective/Observations:   No events overnight resting comfortably in bed.  No complaints of chest pain, dyspnea, or palpitations reported. No signs of orthopnea or PND.     REVIEW OF SYSTEMS: All other review of systems is negative unless indicated above    PAST MEDICAL & SURGICAL HISTORY:  Arthritis  Osteoporosis  Insomnia  No significant past surgical history      MEDICATIONS  (STANDING):  amitriptyline 10 milliGRAM(s) Oral at bedtime  amLODIPine   Tablet 10 milliGRAM(s) Oral daily  aspirin 325 milliGRAM(s) Oral daily  atorvastatin 40 milliGRAM(s) Oral at bedtime  calcium carbonate 1250 mG  + Vitamin D (OsCal 500 + D) 1 Tablet(s) Oral daily  enoxaparin Injectable 40 milliGRAM(s) SubCutaneous at bedtime  melatonin 3 milliGRAM(s) Oral at bedtime    MEDICATIONS  (PRN):  hydrALAZINE Injectable 5 milliGRAM(s) IV Push every 6 hours PRN sbp >200      Allergies    No Known Allergies    Intolerances        Vital Signs Last 24 Hrs  T(C): 36.7 (18 Feb 2020 11:23), Max: 36.9 (17 Feb 2020 12:47)  T(F): 98 (18 Feb 2020 11:23), Max: 98.5 (17 Feb 2020 12:47)  HR: 80 (18 Feb 2020 11:23) (78 - 92)  BP: 130/75 (18 Feb 2020 11:23) (124/71 - 155/72)  BP(mean): --  RR: 17 (18 Feb 2020 11:23) (16 - 18)  SpO2: 97% (18 Feb 2020 11:23) (97% - 99%)    I&O's Summary        PHYSICAL EXAM:  TELE: SR  Constitutional: NAD, awake and alert, well-developed  HEENT: Moist Mucous Membranes, Anicteric  Pulmonary: Non-labored, breath sounds are clear bilaterally, No wheezing, crackles or rhonchi  Cardiovascular: Regular, S1 and S2 nl, No murmurs, rubs, gallops or clicks  Gastrointestinal: Bowel Sounds present, soft, nontender.   Lymph: No lymphadenopathy. No peripheral edema.  Skin: No visible rashes or ulcers.  Psych:  Mood & affect appropriate    LABS: All Labs Reviewed:                        12.0   7.14  )-----------( 280      ( 18 Feb 2020 06:52 )             37.9                         11.9   6.80  )-----------( 273      ( 17 Feb 2020 07:18 )             36.7                         12.3   8.15  )-----------( 274      ( 16 Feb 2020 16:57 )             38.7     18 Feb 2020 06:52    141    |  106    |  17     ----------------------------<  93     4.2     |  30     |  0.85   17 Feb 2020 07:18    140    |  105    |  16     ----------------------------<  95     3.7     |  29     |  0.83   16 Feb 2020 16:57    138    |  103    |  19     ----------------------------<  97     4.1     |  28     |  0.78     Ca    9.3        18 Feb 2020 06:52  Ca    9.3        17 Feb 2020 07:18  Ca    9.7        16 Feb 2020 16:57  Phos  3.3       17 Feb 2020 07:18  Mg     2.2       17 Feb 2020 07:18    TPro  7.8    /  Alb  3.4    /  TBili  0.4    /  DBili  x      /  AST  16     /  ALT  17     /  AlkPhos  98     17 Feb 2020 07:18  TPro  8.8    /  Alb  3.9    /  TBili  0.3    /  DBili  x      /  AST  24     /  ALT  22     /  AlkPhos  112    16 Feb 2020 16:57    PT/INR - ( 16 Feb 2020 16:57 )   PT: 12.4 sec;   INR: 1.10 ratio         PTT - ( 16 Feb 2020 16:57 )  PTT:32.8 sec         ECG:  < from: 12 Lead ECG (02.16.20 @ 18:30) >  Ventricular Rate 82 BPM    Atrial Rate 82 BPM    P-R Interval 118 ms    QRS Duration 82 ms    Q-T Interval 396 ms    QTC Calculation(Bezet) 462 ms    P Axis 64 degrees    R Axis 28 degrees    T Axis 76 degrees    Diagnosis Line Normal sinus rhythm  Normal ECG  No previous ECGs available  Confirmed by marsha Mcclellan ([id number]) on 2/17/2020 12:28:20 PM    < end of copied text >    Echo:  < from: TTE Echo Doppler w/o Cont (02.17.20 @ 12:04) >   EXAM:  ECHO TTE WO CON COMP W DOPPLR         PROCEDURE DATE:  02/17/2020        INTERPRETATION:  INDICATION: CVA  Referring M.D.:Julien  Blood Pressure 177/81        Weight (kg) :59     Height (cm):167       BSA (sq m): 1.68  Technician: YOVANY    Dimensions:    LA 3.1       Normal Values: 2.0 - 4.0 cm    Ao 3.1        Normal Values: 2.0 - 3.8 cm  SEPTUM 1.0       Normal Values: 0.6 - 1.2 cm  PWT 0.9       Normal Values: 0.6 - 1.1 cm  LVIDd 4.7         Normal Values: 3.0 - 5.6 cm  LVIDs 3.1 Normal Values: 1.8 - 4.0 cm      OBSERVATIONS:  Technically difficult study  Mitral Valve: Mac with normally opening mitral valve leaflets. Trace mitral regurgitation.  Aortic Valve/Aorta: Mildly calcified trileaflet aortic valve with normal opening  Tricuspid Valve: Normal tricuspid valve. Mild tricuspid regurgitation.  Pulmonic Valve: The pulmonic valve is not well visualized. Probably normal.  Left Atrium: Moderate enlargement  Right Atrium: Normal  Left Ventricle: Overall preserved left ventricular systolic function. The EF is approximately 55-60%.  Right Ventricle: Normal right ventricular size and function.  Pericardium/Pleura: No pericardial effusion noted.  Pulmonary/RV Pressure: The right ventricular systolic pressure is estimated to be 40mmHg, assuming that the right atrial pressure is estimated to be 8 mmHg. This is consistent with mild pulmonary hypertension.  LV Diastolic Function: Stage 1 diastolic dysfunction    Conclusion: Technically difficult study. Overall preserved left ventricular systolic function. EF 55-60. Mild pulmonary hypertension. Stage I diastolic dysfunction.                  AAKASH NAIDU M.D., ATTENDING CARDIOLOGIST  This document has been electronically signed. Feb 18 2020 11:49AM    < end of copied text >    Radiology:  < from: MR Head No Cont (02.17.20 @ 11:23) >  EXAM:  MR BRAIN                            PROCEDURE DATE:  02/17/2020          INTERPRETATION:  INDICATION:    Sensory disturbance. Right hand numbness  TECHNIQUE:  Multiplanar brain imaging was conducted using a 1.5 Ruth magnet.  T1, T2 and FLAIR imaging was performed.  In addition, diffusion imaging, diffusion coefficient assessment (ADC) and echo planar T2* was incorporated. No contrast administered    COMPARISON EXAMINATION:  CT 2/16/2001    FINDINGS:  HEMISPHERES:  There are scattered chronic ischemic changes in the white matter of both hemispheres with mild volume loss. No acute abnormality or hemorrhagic focus is noted. No diffusion restriction or acute ischemia is suggested.  VENTRICLES:  midline and normal in size  POSTERIOR FOSSA:  The brain stem and cerebellum are unremarkable in appearance.  No CP angle abnormality is noted.  EXTRA-AXIAL:  No mass or collections are depicted.  CRANIAL NERVES:  No abnormality noted.  VASCULATURE:  The major vessels and venous sinuses are grossly patent.    SINUSES AND MASTOIDS:  Clear.  MISCELLANEOUS:  No orbital or pituitary abnormality noted.  No skull base lesion suggested.    IMPRESSION:      1)  mild scattered chronic ischemic changes with volume loss. No diffusion restriction or MR evidence of acute ischemia..  2)  no space-occupying lesion identified..       MICHELLE JOHNSON M.D., ATTENDING RADIOLOGIST  This document has been electronically signed. Feb 17 2020 11:16AM        < end of copied text > [facility] -- Luis Landis, rTinity, Jerzy, Rl, Vy Naidu  Office # [phone]      Follow Up:    CVA/TIA    Subjective/Observations:   No events overnight resting comfortably in bed.  No complaints of chest pain, dyspnea, or palpitations reported. No signs of orthopnea or PND.     REVIEW OF SYSTEMS: All other review of systems is negative unless indicated above    PAST MEDICAL & SURGICAL HISTORY:  Arthritis  Osteoporosis  Insomnia  No significant past surgical history      MEDICATIONS  (STANDING):  amitriptyline 10 milliGRAM(s) Oral at bedtime  amLODIPine   Tablet 10 milliGRAM(s) Oral daily  aspirin 325 milliGRAM(s) Oral daily  atorvastatin 40 milliGRAM(s) Oral at bedtime  calcium carbonate 1250 mG  + Vitamin D (OsCal 500 + D) 1 Tablet(s) Oral daily  enoxaparin Injectable 40 milliGRAM(s) SubCutaneous at bedtime  melatonin 3 milliGRAM(s) Oral at bedtime    MEDICATIONS  (PRN):  hydrALAZINE Injectable 5 milliGRAM(s) IV Push every 6 hours PRN sbp >200      Allergies    No Known Allergies    Intolerances        Vital Signs Last 24 Hrs  T(C): 36.7 (18 Feb 2020 11:23), Max: 36.9 (17 Feb 2020 12:47)  T(F): 98 (18 Feb 2020 11:23), Max: 98.5 (17 Feb 2020 12:47)  HR: 80 (18 Feb 2020 11:23) (78 - 92)  BP: 130/75 (18 Feb 2020 11:23) (124/71 - 155/72)  BP(mean): --  RR: 17 (18 Feb 2020 11:23) (16 - 18)  SpO2: 97% (18 Feb 2020 11:23) (97% - 99%)    I&O's Summary        PHYSICAL EXAM:  TELE: SR  Constitutional: NAD, awake and alert, well-developed  HEENT: Moist Mucous Membranes, Anicteric  Pulmonary: Non-labored, breath sounds are clear bilaterally, No wheezing, crackles or rhonchi  Cardiovascular: Regular, S1 and S2 nl, No murmurs, rubs, gallops or clicks  Gastrointestinal: Bowel Sounds present, soft, nontender.   Lymph: No lymphadenopathy. No peripheral edema.  Skin: No visible rashes or ulcers.  Psych:  Mood & affect appropriate    LABS: All Labs Reviewed:                        12.0   7.14  )-----------( 280      ( 18 Feb 2020 06:52 )             37.9                         11.9   6.80  )-----------( 273      ( 17 Feb 2020 07:18 )             36.7                         12.3   8.15  )-----------( 274      ( 16 Feb 2020 16:57 )             38.7     18 Feb 2020 06:52    141    |  106    |  17     ----------------------------<  93     4.2     |  30     |  0.85   17 Feb 2020 07:18    140    |  105    |  16     ----------------------------<  95     3.7     |  29     |  0.83   16 Feb 2020 16:57    138    |  103    |  19     ----------------------------<  97     4.1     |  28     |  0.78     Ca    9.3        18 Feb 2020 06:52  Ca    9.3        17 Feb 2020 07:18  Ca    9.7        16 Feb 2020 16:57  Phos  3.3       17 Feb 2020 07:18  Mg     2.2       17 Feb 2020 07:18    TPro  7.8    /  Alb  3.4    /  TBili  0.4    /  DBili  x      /  AST  16     /  ALT  17     /  AlkPhos  98     17 Feb 2020 07:18  TPro  8.8    /  Alb  3.9    /  TBili  0.3    /  DBili  x      /  AST  24     /  ALT  22     /  AlkPhos  112    16 Feb 2020 16:57    PT/INR - ( 16 Feb 2020 16:57 )   PT: 12.4 sec;   INR: 1.10 ratio         PTT - ( 16 Feb 2020 16:57 )  PTT:32.8 sec         ECG:  < from: 12 Lead ECG (02.16.20 @ 18:30) >  Ventricular Rate 82 BPM    Atrial Rate 82 BPM    P-R Interval 118 ms    QRS Duration 82 ms    Q-T Interval 396 ms    QTC Calculation(Bezet) 462 ms    P Axis 64 degrees    R Axis 28 degrees    T Axis 76 degrees    Diagnosis Line Normal sinus rhythm  Normal ECG  No previous ECGs available  Confirmed by marsha Mcclellan ([id number]) on 2/17/2020 12:28:20 PM    < end of copied text >    Echo:  < from: TTE Echo Doppler w/o Cont (02.17.20 @ 12:04) >   EXAM:  ECHO TTE WO CON COMP W DOPPLR         PROCEDURE DATE:  02/17/2020        INTERPRETATION:  INDICATION: CVA  Referring M.D.:Julien  Blood Pressure 177/81        Weight (kg) :59     Height (cm):167       BSA (sq m): 1.68  Technician: YOVANY    Dimensions:    LA 3.1       Normal Values: 2.0 - 4.0 cm    Ao 3.1        Normal Values: 2.0 - 3.8 cm  SEPTUM 1.0       Normal Values: 0.6 - 1.2 cm  PWT 0.9       Normal Values: 0.6 - 1.1 cm  LVIDd 4.7         Normal Values: 3.0 - 5.6 cm  LVIDs 3.1 Normal Values: 1.8 - 4.0 cm      OBSERVATIONS:  Technically difficult study  Mitral Valve: Mac with normally opening mitral valve leaflets. Trace mitral regurgitation.  Aortic Valve/Aorta: Mildly calcified trileaflet aortic valve with normal opening  Tricuspid Valve: Normal tricuspid valve. Mild tricuspid regurgitation.  Pulmonic Valve: The pulmonic valve is not well visualized. Probably normal.  Left Atrium: Moderate enlargement  Right Atrium: Normal  Left Ventricle: Overall preserved left ventricular systolic function. The EF is approximately 55-60%.  Right Ventricle: Normal right ventricular size and function.  Pericardium/Pleura: No pericardial effusion noted.  Pulmonary/RV Pressure: The right ventricular systolic pressure is estimated to be 40mmHg, assuming that the right atrial pressure is estimated to be 8 mmHg. This is consistent with mild pulmonary hypertension.  LV Diastolic Function: Stage 1 diastolic dysfunction    Conclusion: Technically difficult study. Overall preserved left ventricular systolic function. EF 55-60. Mild pulmonary hypertension. Stage I diastolic dysfunction.                  AAKASH NAIDU M.D., ATTENDING CARDIOLOGIST  This document has been electronically signed. Feb 18 2020 11:49AM    < end of copied text >    Radiology:  < from: MR Head No Cont (02.17.20 @ 11:23) >  EXAM:  MR BRAIN                            PROCEDURE DATE:  02/17/2020          INTERPRETATION:  INDICATION:    Sensory disturbance. Right hand numbness  TECHNIQUE:  Multiplanar brain imaging was conducted using a 1.5 Ruth magnet.  T1, T2 and FLAIR imaging was performed.  In addition, diffusion imaging, diffusion coefficient assessment (ADC) and echo planar T2* was incorporated. No contrast administered    COMPARISON EXAMINATION:  CT 2/16/2001    FINDINGS:  HEMISPHERES:  There are scattered chronic ischemic changes in the white matter of both hemispheres with mild volume loss. No acute abnormality or hemorrhagic focus is noted. No diffusion restriction or acute ischemia is suggested.  VENTRICLES:  midline and normal in size  POSTERIOR FOSSA:  The brain stem and cerebellum are unremarkable in appearance.  No CP angle abnormality is noted.  EXTRA-AXIAL:  No mass or collections are depicted.  CRANIAL NERVES:  No abnormality noted.  VASCULATURE:  The major vessels and venous sinuses are grossly patent.    SINUSES AND MASTOIDS:  Clear.  MISCELLANEOUS:  No orbital or pituitary abnormality noted.  No skull base lesion suggested.    IMPRESSION:      1)  mild scattered chronic ischemic changes with volume loss. No diffusion restriction or MR evidence of acute ischemia..  2)  no space-occupying lesion identified..       MICHELLE JOHNSON M.D., ATTENDING RADIOLOGIST  This document has been electronically signed. Feb 17 2020 11:16AM        < end of copied text >

## 2021-11-19 ENCOUNTER — EMERGENCY (EMERGENCY)
Facility: HOSPITAL | Age: 81
LOS: 1 days | Discharge: HOME | End: 2021-11-19
Attending: EMERGENCY MEDICINE | Admitting: EMERGENCY MEDICINE
Payer: MEDICARE

## 2021-11-19 VITALS
OXYGEN SATURATION: 98 % | DIASTOLIC BLOOD PRESSURE: 88 MMHG | RESPIRATION RATE: 16 BRPM | HEIGHT: 66 IN | SYSTOLIC BLOOD PRESSURE: 183 MMHG | TEMPERATURE: 97 F | WEIGHT: 136.91 LBS | HEART RATE: 74 BPM

## 2021-11-19 VITALS
OXYGEN SATURATION: 97 % | DIASTOLIC BLOOD PRESSURE: 81 MMHG | HEART RATE: 80 BPM | TEMPERATURE: 98 F | RESPIRATION RATE: 17 BRPM | SYSTOLIC BLOOD PRESSURE: 174 MMHG

## 2021-11-19 PROCEDURE — 70450 CT HEAD/BRAIN W/O DYE: CPT | Mod: 26,ME

## 2021-11-19 PROCEDURE — 10120 INC&RMVL FB SUBQ TISS SMPL: CPT

## 2021-11-19 PROCEDURE — 73140 X-RAY EXAM OF FINGER(S): CPT | Mod: 26,LT

## 2021-11-19 PROCEDURE — 70450 CT HEAD/BRAIN W/O DYE: CPT | Mod: ME

## 2021-11-19 PROCEDURE — 99284 EMERGENCY DEPT VISIT MOD MDM: CPT | Mod: 25

## 2021-11-19 PROCEDURE — 73140 X-RAY EXAM OF FINGER(S): CPT

## 2021-11-19 PROCEDURE — 90471 IMMUNIZATION ADMIN: CPT

## 2021-11-19 PROCEDURE — G1004: CPT

## 2021-11-19 PROCEDURE — 12013 RPR F/E/E/N/L/M 2.6-5.0 CM: CPT

## 2021-11-19 RX ORDER — TETANUS TOXOID, REDUCED DIPHTHERIA TOXOID AND ACELLULAR PERTUSSIS VACCINE, ADSORBED 5; 2.5; 8; 8; 2.5 [IU]/.5ML; [IU]/.5ML; UG/.5ML; UG/.5ML; UG/.5ML
0.5 SUSPENSION INTRAMUSCULAR ONCE
Refills: 0 | Status: COMPLETED | OUTPATIENT
Start: 2021-11-19 | End: 2021-11-19

## 2021-11-19 RX ORDER — ACETAMINOPHEN 500 MG
650 TABLET ORAL ONCE
Refills: 0 | Status: COMPLETED | OUTPATIENT
Start: 2021-11-19 | End: 2021-11-19

## 2021-11-19 RX ADMIN — Medication 650 MILLIGRAM(S): at 19:02

## 2021-11-19 RX ADMIN — Medication 1 TABLET(S): at 19:02

## 2021-11-19 NOTE — ED PROCEDURE NOTE - PROCEDURE ADDITIONAL DETAILS
Ring removed from L finger #4 using Dremel, heather forceps and pliers   pt tolerated procedure with no complications  Ring placed in specimen container and given to patient
SHOWER TOMORROW  BACITRACIN OINTMENT 2-3 X DAILY  PATIENT HAS SUPERFICIAL SCRATCHES ON THE MUCOSA AND A BROKEN LOWER TOOTH  CAREFUL WITH DIET, NO HOT OR LARGE BITES  FOLLOW UP IN OFFICE ON FRIDAY

## 2021-11-19 NOTE — ED PROVIDER NOTE - CLINICAL SUMMARY MEDICAL DECISION MAKING FREE TEXT BOX
77 yo F p/w facial laceration and R stuck left 4th finger sp [facility] trip on rug and fall ---> CT ro bleed, XR ro fx, plastics repair

## 2021-11-19 NOTE — ED PROVIDER NOTE - CARE PROVIDER_API CALL
Juve Ramirez  PLASTIC SURGERY  [address] [place] [address]  Phone: [phone]  Fax: [phone]  Follow Up Time:

## 2021-11-19 NOTE — ED PROVIDER NOTE - OBJECTIVE STATEMENT
82 yo F presents to ED with daughter c/o facial laceration s/p trip on rug and fall onto face. Denies LOC, headache, neck pain, dizziness. admits to daily asa use. pt also c/o ring stuck to left 4th finger, unable to get it off due to swelling but pt not complaining of pain to area. unknown last tetanus. pt requesting plastics repair.

## 2021-11-19 NOTE — ED PROVIDER NOTE - PROGRESS NOTE DETAILS
pt well appearing, ambulating, NVI. ring removed (FROM finger, nontender exam once ring removed); workup negative. facial lacerations repaired by plastics. pt states she plans to see her dentist first thing in the AM. Discussed the results of all diagnostic testing in ED and copies of all reports given.   Pt was given an opportunity to have all questions answered to satisfaction.  Discussed the importance of prompt, close medical follow-up. ED return precautions discussed at length.  Pt verbalizes agreement and understanding of plan and ED return precautions. Pt well appearing, stable for DC home. No emergent concerns at this time.

## 2021-11-19 NOTE — ED ADULT NURSE NOTE - NSIMPLEMENTINTERV_GEN_ALL_ED
Implemented All Fall Risk Interventions:  [place] to call system. Call bell, personal items and telephone within reach. Instruct patient to call for assistance. Room bathroom lighting operational. Non-slip footwear when patient is off stretcher. Physically safe environment: no spills, clutter or unnecessary equipment. Stretcher in lowest position, wheels locked, appropriate side rails in place. Provide visual cue, wrist band, yellow gown, etc. Monitor gait and stability. Monitor for mental status changes and reorient to person, place, and time. Review medications for side effects contributing to fall risk. Reinforce activity limits and safety measures with patient and family.

## 2021-11-19 NOTE — ED PROVIDER NOTE - PATIENT PORTAL LINK FT
You can access the FollowMyHealth Patient Portal offered by [facility] by registering at the following website: [email or web address]. By joining [facility]’s FollowMyHealth portal, you will also be able to view your health information using other applications (apps) compatible with our system.

## 2021-11-19 NOTE — ED PROVIDER NOTE - ATTENDING CONTRIBUTION TO CARE
80 yo F p/w trip and fall today on rug - hit head. NO LOC. no HA/n/v/dizzy. No cp/sob/palp. no cough/ uri. Pt fully vaccinated. No neck /back pain. no numb/ting/focal weak. Pt co swelling to L 4th with ring stuck on finger. Pt co lac to face - min bleeding. Pt req plastics for facial lac.   Exam: MM Moist . neck supple . no meningeal signs. Pos 1cm lower lip lac, 3cm chin lac. Pos loose tooth as well. L 4th finger with mild swelling, no acute tenderness. nl dist str/sens equal bl, 2+ pulses. nl cap refill. Nl non-focal neuro exam. No other acute findings.   Check CT, plastics, xr, outpt fu  At length discussion with patient in regards to the head inj.  Discussed importance of prompt, close medical follow-up.  Discussed importance of avoiding activities where the patient would be at risk for further head injury, for a minimum of 2 weeks.  Discussed head injury precautions and instructions as well.  Patient demonstrates understanding of all instructions.

## 2021-11-19 NOTE — ED PROVIDER NOTE - NSFOLLOWUPINSTRUCTIONS_ED_ALL_ED_FT
----- Message from Akila Son sent at 8/16/2019  9:58 AM CDT -----  Contact: [phone]/self  Patient requesting to speak with you regarding getting medication for nausea and vomiting. Please advise.      
Pt requesting zofran for nausea  
1) Follow up with Dr. Ramirez within 1 week. Take medication as prescribed.   2) Return to the ED immediately for new or worsening symptoms as we discussed.      FACIAL LACERATION - AfterCare(R) Instructions(ER/ED)     Facial Laceration    WHAT YOU NEED TO KNOW:    A facial laceration is a tear or cut in the skin. Facial lacerations may be closed within 24 hours of injury.    DISCHARGE INSTRUCTIONS:    Return to the emergency department if:   •You have a fever and the wound is painful, warm, or swollen. The wound area may be red, or fluid may come out of it.      •You have heavy bleeding or bleeding that does not stop after 10 minutes of holding firm, direct pressure over the wound.      Call your doctor if:   •Your wound reopens or your tape comes off.      •Your wound is very painful.      •Your wound is not healing, or you think there is an object in the wound.      •The skin around your wound stays numb.      •You have questions or concerns about your condition or care.      Medicines:   •Antibiotics may be given to prevent an infection if your wound was deep and had to be cleaned out.      •Take your medicine as directed. Contact your healthcare provider if you think your medicine is not helping or if you have side effects. Tell him of her if you are allergic to any medicine. Keep a list of the medicines, vitamins, and herbs you take. Include the amounts, and when and why you take them. Bring the list or the pill bottles to follow-up visits. Carry your medicine list with you in case of an emergency.      Care for your wound: Care for your wound as directed to prevent infection and help it heal. Wash your hands with soap and warm water before and after you care for your wound. You may need to keep the wound dry for the first 24 to 48 hours. When your healthcare provider says it is okay, wash around your wound with soap and water, or as directed. Gently pat the area dry. Do not use alcohol or hydrogen peroxide to clean your wound unless you are directed to.  •Do not take aspirin or NSAIDs for 24 hours after being injured. Aspirin and NSAIDs can increase blood flow. Your laceration may continue to bleed.       •Do not take hot showers, eat or drink hot foods and liquids for 48 hours after being injured. Also, do not use a heating pad near your laceration. The heat can cause swelling in and around your laceration.      •If your wound was covered with a bandage, leave your bandage on as long as directed. Bandages keep your wound clean and protected. They can also prevent swelling. Ask when and how to change your bandage. Be careful not to apply the bandage or tape too tightly. This could cut off blood flow and cause more injury.       •If your wound was closed with stitches, keep your wound clean. Your healthcare provider may recommend that you apply antibiotic ointment after you clean your wound.       •If your wound was closed with wound tape or medical strips, keep the area clean and dry. The strips will usually fall off on their own after several days.      •If your wound was closed with tissue glue, do not use any ointments or lotions on the area. You may shower, but do not swim or soak in a bathtub. Gently pat the area dry after you take a shower. Do not pick at or scrub the glue area.       Decrease scarring: The skin in the area of your wound may turn a different color if it is exposed to direct sunlight. After your wound is healed, use sunscreen over the area when you are out in the sun. You should do this for at least 6 months to 1 year after your injury. Some wounds scar less if they are covered while they heal.    Follow up with your doctor as directed: You may need to follow up with your healthcare provider in 24 to 48 hours to have your wound checked for infection. You may need to return in 3 to 5 days if you have stitches that need to be removed. Write down your questions so you remember to ask them during your visits.       © Copyright [employer] 2021           back to top                          © Copyright [employer] 2021

## 2021-11-19 NOTE — ED ADULT NURSE NOTE - OBJECTIVE STATEMENT
Pt. received alert and oriented x3 with chief complaint of trip and fall w/ right lower lip laceration and left hand fourth finger pain and swelling.

## 2021-11-19 NOTE — ED PROVIDER NOTE - WR INTERPRETATION 1
MSK XR negative - No fracture, No dislocation, radiopaque ring noted, will correct clinically for tenderness

## 2022-04-08 NOTE — PATIENT PROFILE ADULT - FALL HARM RISK
Patient is requesting a refill on her pregabalin (Lyrica) 150 MG capsule. Patient has two pills left and takes 3 a day. Patient states she didn't realize that she couldn't use her spinal stimulator for 14 days and didn't ask for a refill prior to that. Patient is looking for a refill to help until she can use her spinal stimulator.      Please advise   age(85 years old or older)

## 2022-10-17 NOTE — PHARMACOTHERAPY INTERVENTION NOTE - INTERVENTION CATEGORIES
Called patient today and reached STELLA BENITEZ I am calling to review the outcome of her transplant evaluation. Explained she needs to complete some lab work and an echocardiogram yet. Explained I will be sending a letter to her now in My Chart. Asked her to return my call to discuss outcome.      Generated Transplant Evaluation Summary Letter today in Epic - electronically sent to pt/ providers.    Patient Education

## 2022-12-30 ENCOUNTER — OFFICE (OUTPATIENT)
Dept: URBAN - METROPOLITAN AREA CLINIC 109 | Facility: CLINIC | Age: 82
Setting detail: OPHTHALMOLOGY
End: 2022-12-30
Payer: MEDICARE

## 2022-12-30 DIAGNOSIS — H02.834: ICD-10-CM

## 2022-12-30 DIAGNOSIS — H02.831: ICD-10-CM

## 2022-12-30 DIAGNOSIS — L30.8: ICD-10-CM

## 2022-12-30 DIAGNOSIS — H02.835: ICD-10-CM

## 2022-12-30 DIAGNOSIS — H02.832: ICD-10-CM

## 2022-12-30 PROBLEM — H01.005 BLEPHARITIS; RIGHT UPPER LID, RIGHT LOWER LID, LEFT UPPER LID, LEFT LOWER LID: Status: ACTIVE | Noted: 2022-12-30

## 2022-12-30 PROBLEM — H25.13 CATARACT SENILE NUCLEAR SCLEROSIS; BOTH EYES: Status: ACTIVE | Noted: 2022-12-30

## 2022-12-30 PROBLEM — H01.001 BLEPHARITIS; RIGHT UPPER LID, RIGHT LOWER LID, LEFT UPPER LID, LEFT LOWER LID: Status: ACTIVE | Noted: 2022-12-30

## 2022-12-30 PROBLEM — H16.223 DRY EYE SYNDROME K SICCA; BOTH EYES: Status: ACTIVE | Noted: 2022-12-30

## 2022-12-30 PROBLEM — H01.004 BLEPHARITIS; RIGHT UPPER LID, RIGHT LOWER LID, LEFT UPPER LID, LEFT LOWER LID: Status: ACTIVE | Noted: 2022-12-30

## 2022-12-30 PROBLEM — H01.002 BLEPHARITIS; RIGHT UPPER LID, RIGHT LOWER LID, LEFT UPPER LID, LEFT LOWER LID: Status: ACTIVE | Noted: 2022-12-30

## 2022-12-30 PROCEDURE — 92012 INTRM OPH EXAM EST PATIENT: CPT | Performed by: OPHTHALMOLOGY

## 2022-12-30 ASSESSMENT — REFRACTION_MANIFEST
OD_SPHERE: PLANO
OS_SPHERE: +0.50
OD_CYLINDER: -0.50
OS_VA1: 20/30
OD_AXIS: 090
OD_VA1: 20/20
OD_SPHERE: -0.50
OD_VA1: 20/20-
OS_AXIS: 170
OS_SPHERE: +0.50
OS_SPHERE: PLANO
OS_AXIS: 075
OS_VA1: 20/20-
OS_AXIS: 075
OD_CYLINDER: +1.00
OS_CYLINDER: +1.25
OD_VA1: 20/25-2
OD_AXIS: 180
OD_CYLINDER: -0.75
OD_AXIS: 090
OS_SPHERE: PLANO
OD_AXIS: 090
OS_SPHERE: +3.00
OS_VA1: 20/40
OS_VA1: 20/20-2
OD_VA1: 20/20-2
OS_CYLINDER: -1.25
OD_SPHERE: +0.50
OD_SPHERE: +0.50
OD_CYLINDER: -0.75
OS_CYLINDER: -0.75
OS_CYLINDER: -0.75
OD_SPHERE: +3.00
OS_AXIS: 080

## 2022-12-30 ASSESSMENT — DRY EYES - PHYSICIAN NOTES
OS_GENERALCOMMENTS: TEAR FILM DEBRIS
OD_GENERALCOMMENTS: TEAR FILM DEBRIS

## 2022-12-30 ASSESSMENT — SPHEQUIV_DERIVED
OD_SPHEQUIV: 0.125
OS_SPHEQUIV: -0.125
OD_SPHEQUIV: 0.125
OD_SPHEQUIV: 0.125
OS_SPHEQUIV: 1.125
OD_SPHEQUIV: 0
OS_SPHEQUIV: 0

## 2022-12-30 ASSESSMENT — REFRACTION_AUTOREFRACTION
OD_AXIS: 091
OD_CYLINDER: -0.75
OS_AXIS: 078
OS_SPHERE: +0.50
OS_CYLINDER: -1.00
OD_SPHERE: +0.50

## 2022-12-30 ASSESSMENT — KERATOMETRY
OS_K1POWER_DIOPTERS: 44.75
METHOD_AUTO_MANUAL: AUTO
OD_AXISANGLE_DEGREES: 090
OD_K2POWER_DIOPTERS: 45.25
OS_K2POWER_DIOPTERS: 45.25
OD_K1POWER_DIOPTERS: 45.25
OS_AXISANGLE_DEGREES: 162

## 2022-12-30 ASSESSMENT — LID EXAM ASSESSMENTS
OS_BLEPHARITIS: LLL LUL 2+
OS_DERMATOCHALASIS: 1+
OD_BLEPHARITIS: RLL RUL 2+
OD_DERMATOCHALASIS: 1+

## 2022-12-30 ASSESSMENT — VISUAL ACUITY
OS_BCVA: 20/30-2
OD_BCVA: 20/30-1

## 2022-12-30 ASSESSMENT — TONOMETRY
OD_IOP_MMHG: 13
OS_IOP_MMHG: 14

## 2022-12-30 ASSESSMENT — AXIALLENGTH_DERIVED
OS_AL: 23.1001
OD_AL: 22.9201
OD_AL: 22.9662
OD_AL: 22.9201
OD_AL: 22.9201
OS_AL: 22.6424
OS_AL: 23.0535

## 2022-12-30 ASSESSMENT — CONFRONTATIONAL VISUAL FIELD TEST (CVF)
OS_FINDINGS: FULL
OD_FINDINGS: FULL

## 2023-04-18 ENCOUNTER — OFFICE (OUTPATIENT)
Dept: URBAN - METROPOLITAN AREA CLINIC 35 | Facility: CLINIC | Age: 83
Setting detail: OPHTHALMOLOGY
End: 2023-04-18
Payer: MEDICARE

## 2023-04-18 ENCOUNTER — RX ONLY (RX ONLY)
Age: 83
End: 2023-04-18

## 2023-04-18 DIAGNOSIS — H02.831: ICD-10-CM

## 2023-04-18 DIAGNOSIS — L30.8: ICD-10-CM

## 2023-04-18 DIAGNOSIS — H02.834: ICD-10-CM

## 2023-04-18 DIAGNOSIS — H25.13: ICD-10-CM

## 2023-04-18 PROCEDURE — 92014 COMPRE OPH EXAM EST PT 1/>: CPT | Performed by: OPHTHALMOLOGY

## 2023-04-18 ASSESSMENT — REFRACTION_MANIFEST
OS_AXIS: 075
OD_SPHERE: -0.50
OD_AXIS: 085
OS_SPHERE: +0.75
OD_AXIS: 090
OS_CYLINDER: -0.75
OD_SPHERE: +0.50
OD_VA1: 20/25-2
OS_CYLINDER: +1.25
OS_AXIS: 080
OS_AXIS: 085
OS_SPHERE: +0.50
OS_CYLINDER: -1.75
OS_VA1: 20/30-1
OS_AXIS: 088
OS_CYLINDER: -1.25
OD_CYLINDER: -0.75
OS_SPHERE: +0.50
OS_VA1: 20/40
OD_CYLINDER: +0.75
OS_VA1: 20/30
OD_SPHERE: +0.75
OS_SPHERE: PLANO
OD_SPHERE: +0.50
OS_AXIS: 170
OD_AXIS: 090
OD_CYLINDER: -0.50
OD_VA1: 20/40-
OS_VA1: 20/20-2
OD_AXIS: 090
OD_AXIS: 180
OS_AXIS: 075
OD_VA1: 20/20-2
OD_SPHERE: +0.75
OD_VA1: 20/20-
OS_SPHERE: PLANO
OD_SPHERE: PLANO
OS_SPHERE: PLANO
OS_VA1: 20/50
OD_CYLINDER: +1.00
OS_VA1: 20/20-
OS_CYLINDER: -1.00
OS_SPHERE: +3.00
OD_CYLINDER: -0.75
OD_CYLINDER: -1.00
OD_AXIS: 090
OS_CYLINDER: -0.75
OD_VA1: 20/20
OD_VA1: 20/40-
OD_SPHERE: +3.00

## 2023-04-18 ASSESSMENT — REFRACTION_AUTOREFRACTION
OD_CYLINDER: -1.00
OS_AXIS: 086
OS_SPHERE: +0.50
OD_AXIS: 088
OD_SPHERE: +0.75
OS_CYLINDER: -1.75

## 2023-04-18 ASSESSMENT — SPHEQUIV_DERIVED
OS_SPHEQUIV: -0.125
OD_SPHEQUIV: 0.125
OD_SPHEQUIV: 0
OS_SPHEQUIV: -0.125
OD_SPHEQUIV: 0.125
OD_SPHEQUIV: 0.25
OS_SPHEQUIV: -0.375
OD_SPHEQUIV: 1.125
OD_SPHEQUIV: 0.25
OS_SPHEQUIV: 1.125

## 2023-04-18 ASSESSMENT — KERATOMETRY
OD_AXISANGLE_DEGREES: 170
OD_K2POWER_DIOPTERS: 45.50
METHOD_AUTO_MANUAL: AUTO
OS_K1POWER_DIOPTERS: 44.50
OS_K2POWER_DIOPTERS: 45.25
OS_AXISANGLE_DEGREES: 174
OD_K1POWER_DIOPTERS: 45.00

## 2023-04-18 ASSESSMENT — AXIALLENGTH_DERIVED
OD_AL: 22.5582
OD_AL: 22.8742
OD_AL: 22.9662
OD_AL: 22.9201
OD_AL: 22.9201
OS_AL: 22.6848
OS_AL: 23.2383
OS_AL: 23.1442
OS_AL: 23.1442
OD_AL: 22.8742

## 2023-04-18 ASSESSMENT — LID EXAM ASSESSMENTS
OD_BLEPHARITIS: RLL RUL 2+
OS_BLEPHARITIS: LLL LUL 2+
OS_DERMATOCHALASIS: 1+
OD_DERMATOCHALASIS: 1+

## 2023-04-18 ASSESSMENT — CONFRONTATIONAL VISUAL FIELD TEST (CVF)
OD_FINDINGS: FULL
OS_FINDINGS: FULL

## 2023-04-18 ASSESSMENT — VISUAL ACUITY
OD_BCVA: 20/40
OS_BCVA: 20/40

## 2023-04-18 ASSESSMENT — DRY EYES - PHYSICIAN NOTES
OD_GENERALCOMMENTS: TEAR FILM DEBRIS
OS_GENERALCOMMENTS: TEAR FILM DEBRIS

## 2023-04-18 ASSESSMENT — TONOMETRY
OS_IOP_MMHG: 18
OD_IOP_MMHG: 20

## 2023-04-29 ENCOUNTER — NON-APPOINTMENT (OUTPATIENT)
Age: 83
End: 2023-04-29

## 2023-06-12 ENCOUNTER — OFFICE (OUTPATIENT)
Dept: URBAN - METROPOLITAN AREA CLINIC 35 | Facility: CLINIC | Age: 83
Setting detail: OPHTHALMOLOGY
End: 2023-06-12
Payer: MEDICARE

## 2023-06-12 ENCOUNTER — RX ONLY (RX ONLY)
Age: 83
End: 2023-06-12
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
## 2023-06-12 DIAGNOSIS — H00.14: ICD-10-CM

## 2023-06-12 PROBLEM — H02.83 DERMATOCHALASIS; RIGHT UPPER LID, LEFT UPPER LID: Status: ACTIVE | Noted: 2023-06-12

## 2023-06-12 PROCEDURE — 92012 INTRM OPH EXAM EST PATIENT: CPT | Performed by: OPHTHALMOLOGY

## 2023-06-12 PROCEDURE — 55555 MISCELLANEOUS CHARGES: CPT | Performed by: OPHTHALMOLOGY

## 2023-06-12 ASSESSMENT — KERATOMETRY
OD_AXISANGLE_DEGREES: 155
OS_K2POWER_DIOPTERS: 45.75
METHOD_AUTO_MANUAL: AUTO
OS_AXISANGLE_DEGREES: 117
OD_K1POWER_DIOPTERS: 45.00
OD_K2POWER_DIOPTERS: 45.75

## 2023-06-12 ASSESSMENT — REFRACTION_AUTOREFRACTION
OD_SPHERE: +0.75
OS_CYLINDER: -2.00
OS_SPHERE: +0.75
OS_AXIS: 084
OD_AXIS: 082
OD_CYLINDER: -1.00

## 2023-06-12 ASSESSMENT — SPHEQUIV_DERIVED
OD_SPHEQUIV: 0.125
OD_SPHEQUIV: 1.125
OS_SPHEQUIV: -0.25
OD_SPHEQUIV: 0.125
OS_SPHEQUIV: 1.125
OD_SPHEQUIV: 0
OD_SPHEQUIV: 0.25
OS_SPHEQUIV: -0.125
OD_SPHEQUIV: 0.25
OS_SPHEQUIV: -0.125

## 2023-06-12 ASSESSMENT — REFRACTION_MANIFEST
OS_SPHERE: +3.00
OS_SPHERE: PLANO
OS_SPHERE: PLANO
OS_CYLINDER: -1.75
OD_AXIS: 090
OS_CYLINDER: -1.00
OD_SPHERE: PLANO
OD_VA1: 20/20
OD_SPHERE: +0.50
OD_VA1: 20/20-2
OD_VA1: 20/20-
OS_VA1: 20/20-2
OS_VA1: 20/40
OD_SPHERE: +0.75
OS_VA1: 20/20-
OD_VA1: 20/40-
OS_AXIS: 080
OD_VA1: 20/25-2
OD_AXIS: 090
OD_SPHERE: -0.50
OD_AXIS: 180
OD_SPHERE: +0.75
OS_AXIS: 170
OS_CYLINDER: -0.75
OD_VA1: 20/40-
OS_AXIS: 075
OD_SPHERE: +3.00
OS_SPHERE: +0.75
OS_CYLINDER: -0.75
OS_VA1: 20/30-1
OD_AXIS: 090
OS_SPHERE: PLANO
OS_AXIS: 085
OD_CYLINDER: -0.75
OD_CYLINDER: -1.00
OD_CYLINDER: +0.75
OS_SPHERE: +0.50
OS_AXIS: 088
OD_CYLINDER: +1.00
OS_CYLINDER: -1.25
OD_CYLINDER: -0.50
OD_AXIS: 090
OS_VA1: 20/50
OS_CYLINDER: +1.25
OD_CYLINDER: -0.75
OS_AXIS: 075
OD_AXIS: 085
OS_SPHERE: +0.50
OS_VA1: 20/30
OD_SPHERE: +0.50

## 2023-06-12 ASSESSMENT — AXIALLENGTH_DERIVED
OD_AL: 22.9227
OD_AL: 22.8311
OD_AL: 22.8769
OD_AL: 22.5163
OD_AL: 22.8769
OD_AL: 22.8311

## 2023-06-12 ASSESSMENT — DRY EYES - PHYSICIAN NOTES
OS_GENERALCOMMENTS: TEAR FILM DEBRIS
OD_GENERALCOMMENTS: TEAR FILM DEBRIS

## 2023-06-12 ASSESSMENT — CONFRONTATIONAL VISUAL FIELD TEST (CVF)
OD_FINDINGS: FULL
OS_FINDINGS: FULL

## 2023-06-12 ASSESSMENT — VISUAL ACUITY
OD_BCVA: 20/40
OS_BCVA: 20/40

## 2023-06-12 ASSESSMENT — LID EXAM ASSESSMENTS
OS_DERMATOCHALASIS: 1+
OD_MEIBOMITIS: RLL RUL 1+
OD_BLEPHARITIS: RLL RUL 2+
OD_DERMATOCHALASIS: 1+
OS_BLEPHARITIS: LLL LUL 2+
OS_MEIBOMITIS: LLL LUL 1+

## 2024-01-24 ENCOUNTER — OFFICE (OUTPATIENT)
Dept: URBAN - METROPOLITAN AREA CLINIC 35 | Facility: CLINIC | Age: 84
Setting detail: OPHTHALMOLOGY
End: 2024-01-24
Payer: MEDICARE

## 2024-01-24 DIAGNOSIS — H01.001: ICD-10-CM

## 2024-01-24 DIAGNOSIS — H02.834: ICD-10-CM

## 2024-01-24 DIAGNOSIS — H16.223: ICD-10-CM

## 2024-01-24 DIAGNOSIS — H25.13: ICD-10-CM

## 2024-01-24 DIAGNOSIS — H40.013: ICD-10-CM

## 2024-01-24 DIAGNOSIS — H02.831: ICD-10-CM

## 2024-01-24 DIAGNOSIS — H01.004: ICD-10-CM

## 2024-01-24 PROCEDURE — 99214 OFFICE O/P EST MOD 30 MIN: CPT | Performed by: OPHTHALMOLOGY

## 2024-01-24 ASSESSMENT — REFRACTION_MANIFEST
OD_SPHERE: +3.00
OD_SPHERE: +0.50
OS_CYLINDER: -1.00
OD_SPHERE: PLANO
OS_AXIS: 180
OD_AXIS: 175
OD_VA1: 20/20-2
OS_SPHERE: +0.50
OS_VA1: 20/30+2
OD_VA1: 20/40-
OD_VA1: 20/20
OS_AXIS: 088
OS_VA1: 20/50
OS_SPHERE: PLANO
OS_VA1: 20/40
OS_SPHERE: -1.75
OS_SPHERE: PLANO
OD_CYLINDER: +0.75
OD_AXIS: 090
OS_CYLINDER: -0.75
OS_AXIS: 080
OS_AXIS: 170
OS_AXIS: 085
OD_AXIS: 085
OD_CYLINDER: -0.75
OD_CYLINDER: +1.25
OD_VA1: 20/40-
OD_SPHERE: +0.50
OD_AXIS: 090
OD_SPHERE: PLANO
OD_SPHERE: +0.75
OS_VA1: 20/30-1
OS_CYLINDER: -0.75
OD_CYLINDER: -0.50
OS_VA1: 20/20-
OS_SPHERE: PLANO
OS_AXIS: 075
OD_CYLINDER: +1.00
OS_SPHERE: +0.50
OD_AXIS: 090
OS_VA1: 20/20-2
OS_SPHERE: +3.00
OD_SPHERE: -0.50
OD_AXIS: 090
OD_VA1: 20/20-
OS_SPHERE: +0.75
OD_VA1: 20/25-2
OS_CYLINDER: -1.25
OS_CYLINDER: -1.75
OS_AXIS: 075
OD_SPHERE: +0.75
OD_AXIS: 180
OS_VA1: 20/30
OD_CYLINDER: -1.00
OD_CYLINDER: -0.75
OS_CYLINDER: +2.25
OD_VA1: 20/30-2
OS_CYLINDER: +1.25

## 2024-01-24 ASSESSMENT — SPHEQUIV_DERIVED
OS_SPHEQUIV: 1.125
OD_SPHEQUIV: 0.125
OS_SPHEQUIV: -0.125
OD_SPHEQUIV: 1.125
OD_SPHEQUIV: 0.25
OS_SPHEQUIV: -0.625
OS_SPHEQUIV: -0.125
OS_SPHEQUIV: -0.625
OD_SPHEQUIV: 0.125
OD_SPHEQUIV: 0
OD_SPHEQUIV: 0.375

## 2024-01-24 ASSESSMENT — REFRACTION_AUTOREFRACTION
OS_SPHERE: +0.25
OD_AXIS: 084
OD_CYLINDER: -1.25
OD_SPHERE: +1.00
OS_AXIS: 090
OS_CYLINDER: -1.75

## 2024-01-24 ASSESSMENT — CONFRONTATIONAL VISUAL FIELD TEST (CVF)
OS_FINDINGS: FULL
OD_FINDINGS: FULL

## 2024-01-24 ASSESSMENT — LID EXAM ASSESSMENTS
OD_BLEPHARITIS: RUL 2+
OS_MEIBOMITIS: LLL 1+
OD_MEIBOMITIS: RLL 1+
OS_DERMATOCHALASIS: 1+
OS_BLEPHARITIS: LUL 2+
OD_DERMATOCHALASIS: 1+

## 2024-01-24 ASSESSMENT — DRY EYES - PHYSICIAN NOTES
OD_GENERALCOMMENTS: TEAR FILM DEBRIS
OS_GENERALCOMMENTS: TEAR FILM DEBRIS

## 2024-01-24 ASSESSMENT — LID POSITION - DERMATOCHALASIS
OD_DERMATOCHALASIS: RUL 1+
OS_DERMATOCHALASIS: LUL 1+

## 2024-02-12 ENCOUNTER — EMERGENCY (EMERGENCY)
Facility: HOSPITAL | Age: 84
LOS: 1 days | Discharge: HOME | End: 2024-02-12
Attending: STUDENT IN AN ORGANIZED HEALTH CARE EDUCATION/TRAINING PROGRAM | Admitting: STUDENT IN AN ORGANIZED HEALTH CARE EDUCATION/TRAINING PROGRAM
Payer: MEDICARE

## 2024-02-12 VITALS
DIASTOLIC BLOOD PRESSURE: 76 MMHG | RESPIRATION RATE: 18 BRPM | WEIGHT: 139.99 LBS | OXYGEN SATURATION: 97 % | TEMPERATURE: 99 F | SYSTOLIC BLOOD PRESSURE: 176 MMHG | HEART RATE: 66 BPM

## 2024-02-12 LAB
ALBUMIN SERPL ELPH-MCNC: 3.7 G/DL — SIGNIFICANT CHANGE UP (ref 3.3–5)
ALP SERPL-CCNC: 80 U/L — SIGNIFICANT CHANGE UP (ref 40–120)
ALT FLD-CCNC: 33 U/L — SIGNIFICANT CHANGE UP (ref 12–78)
ANION GAP SERPL CALC-SCNC: 4 MMOL/L — LOW (ref 5–17)
APPEARANCE UR: CLEAR — SIGNIFICANT CHANGE UP
AST SERPL-CCNC: 45 U/L — HIGH (ref 15–37)
BASOPHILS # BLD AUTO: 0.03 K/UL — SIGNIFICANT CHANGE UP (ref 0–0.2)
BASOPHILS NFR BLD AUTO: 0.4 % — SIGNIFICANT CHANGE UP (ref 0–2)
BILIRUB SERPL-MCNC: 0.4 MG/DL — SIGNIFICANT CHANGE UP (ref 0.2–1.2)
BILIRUB UR-MCNC: NEGATIVE — SIGNIFICANT CHANGE UP
BUN SERPL-MCNC: 20 MG/DL — SIGNIFICANT CHANGE UP (ref 7–23)
CALCIUM SERPL-MCNC: 9.5 MG/DL — SIGNIFICANT CHANGE UP (ref 8.5–10.1)
CHLORIDE SERPL-SCNC: 108 MMOL/L — SIGNIFICANT CHANGE UP (ref 96–108)
CO2 SERPL-SCNC: 28 MMOL/L — SIGNIFICANT CHANGE UP (ref 22–31)
COLOR SPEC: YELLOW — SIGNIFICANT CHANGE UP
CREAT SERPL-MCNC: 0.87 MG/DL — SIGNIFICANT CHANGE UP (ref 0.5–1.3)
DIFF PNL FLD: ABNORMAL
EGFR: 65 ML/MIN/1.73M2 — SIGNIFICANT CHANGE UP
EOSINOPHIL # BLD AUTO: 0.15 K/UL — SIGNIFICANT CHANGE UP (ref 0–0.5)
EOSINOPHIL NFR BLD AUTO: 1.9 % — SIGNIFICANT CHANGE UP (ref 0–6)
EPI CELLS # UR: PRESENT
GLUCOSE SERPL-MCNC: 137 MG/DL — HIGH (ref 70–99)
GLUCOSE UR QL: NEGATIVE MG/DL — SIGNIFICANT CHANGE UP
HCT VFR BLD CALC: 40.1 % — SIGNIFICANT CHANGE UP (ref 34.5–45)
HGB BLD-MCNC: 12.9 G/DL — SIGNIFICANT CHANGE UP (ref 11.5–15.5)
IMM GRANULOCYTES NFR BLD AUTO: 0.4 % — SIGNIFICANT CHANGE UP (ref 0–0.9)
KETONES UR-MCNC: NEGATIVE MG/DL — SIGNIFICANT CHANGE UP
LEUKOCYTE ESTERASE UR-ACNC: ABNORMAL
LYMPHOCYTES # BLD AUTO: 1.51 K/UL — SIGNIFICANT CHANGE UP (ref 1–3.3)
LYMPHOCYTES # BLD AUTO: 19 % — SIGNIFICANT CHANGE UP (ref 13–44)
MAGNESIUM SERPL-MCNC: 2.2 MG/DL — SIGNIFICANT CHANGE UP (ref 1.6–2.6)
MCHC RBC-ENTMCNC: 31.2 PG — SIGNIFICANT CHANGE UP (ref 27–34)
MCHC RBC-ENTMCNC: 32.2 GM/DL — SIGNIFICANT CHANGE UP (ref 32–36)
MCV RBC AUTO: 96.9 FL — SIGNIFICANT CHANGE UP (ref 80–100)
MONOCYTES # BLD AUTO: 0.55 K/UL — SIGNIFICANT CHANGE UP (ref 0–0.9)
MONOCYTES NFR BLD AUTO: 6.9 % — SIGNIFICANT CHANGE UP (ref 2–14)
NEUTROPHILS # BLD AUTO: 5.69 K/UL — SIGNIFICANT CHANGE UP (ref 1.8–7.4)
NEUTROPHILS NFR BLD AUTO: 71.4 % — SIGNIFICANT CHANGE UP (ref 43–77)
NITRITE UR-MCNC: NEGATIVE — SIGNIFICANT CHANGE UP
NRBC # BLD: 0 /100 WBCS — SIGNIFICANT CHANGE UP (ref 0–0)
PH UR: 7 — SIGNIFICANT CHANGE UP (ref 5–8)
PHOSPHATE SERPL-MCNC: 3.2 MG/DL — SIGNIFICANT CHANGE UP (ref 2.5–4.5)
PLATELET # BLD AUTO: 215 K/UL — SIGNIFICANT CHANGE UP (ref 150–400)
POTASSIUM SERPL-MCNC: 4.2 MMOL/L — SIGNIFICANT CHANGE UP (ref 3.5–5.3)
POTASSIUM SERPL-SCNC: 4.2 MMOL/L — SIGNIFICANT CHANGE UP (ref 3.5–5.3)
PROT SERPL-MCNC: 8.4 G/DL — HIGH (ref 6–8.3)
PROT UR-MCNC: NEGATIVE MG/DL — SIGNIFICANT CHANGE UP
RBC # BLD: 4.14 M/UL — SIGNIFICANT CHANGE UP (ref 3.8–5.2)
RBC # FLD: 13.3 % — SIGNIFICANT CHANGE UP (ref 10.3–14.5)
RBC CASTS # UR COMP ASSIST: 15 /HPF — HIGH (ref 0–4)
SODIUM SERPL-SCNC: 140 MMOL/L — SIGNIFICANT CHANGE UP (ref 135–145)
SP GR SPEC: 1.01 — SIGNIFICANT CHANGE UP (ref 1–1.03)
TROPONIN I, HIGH SENSITIVITY RESULT: 7.6 NG/L — SIGNIFICANT CHANGE UP
UROBILINOGEN FLD QL: 0.2 MG/DL — SIGNIFICANT CHANGE UP (ref 0.2–1)
WBC # BLD: 7.96 K/UL — SIGNIFICANT CHANGE UP (ref 3.8–10.5)
WBC # FLD AUTO: 7.96 K/UL — SIGNIFICANT CHANGE UP (ref 3.8–10.5)
WBC UR QL: 4 /HPF — SIGNIFICANT CHANGE UP (ref 0–5)

## 2024-02-12 PROCEDURE — 99285 EMERGENCY DEPT VISIT HI MDM: CPT | Mod: 25

## 2024-02-12 PROCEDURE — 96374 THER/PROPH/DIAG INJ IV PUSH: CPT | Mod: XU

## 2024-02-12 PROCEDURE — 70496 CT ANGIOGRAPHY HEAD: CPT | Mod: MA

## 2024-02-12 PROCEDURE — 70498 CT ANGIOGRAPHY NECK: CPT | Mod: MA

## 2024-02-12 PROCEDURE — 80053 COMPREHEN METABOLIC PANEL: CPT

## 2024-02-12 PROCEDURE — 70496 CT ANGIOGRAPHY HEAD: CPT | Mod: 26,MA

## 2024-02-12 PROCEDURE — 81001 URINALYSIS AUTO W/SCOPE: CPT

## 2024-02-12 PROCEDURE — 93005 ELECTROCARDIOGRAM TRACING: CPT

## 2024-02-12 PROCEDURE — 99285 EMERGENCY DEPT VISIT HI MDM: CPT

## 2024-02-12 PROCEDURE — 84100 ASSAY OF PHOSPHORUS: CPT

## 2024-02-12 PROCEDURE — 36415 COLL VENOUS BLD VENIPUNCTURE: CPT

## 2024-02-12 PROCEDURE — 84484 ASSAY OF TROPONIN QUANT: CPT

## 2024-02-12 PROCEDURE — 83735 ASSAY OF MAGNESIUM: CPT

## 2024-02-12 PROCEDURE — 70498 CT ANGIOGRAPHY NECK: CPT | Mod: 26,MA

## 2024-02-12 PROCEDURE — 70450 CT HEAD/BRAIN W/O DYE: CPT | Mod: 26,XU,MA

## 2024-02-12 PROCEDURE — 96361 HYDRATE IV INFUSION ADD-ON: CPT

## 2024-02-12 PROCEDURE — 70450 CT HEAD/BRAIN W/O DYE: CPT | Mod: MA

## 2024-02-12 PROCEDURE — 93010 ELECTROCARDIOGRAM REPORT: CPT

## 2024-02-12 PROCEDURE — 85025 COMPLETE CBC W/AUTO DIFF WBC: CPT

## 2024-02-12 RX ORDER — MECLIZINE HCL 12.5 MG
1 TABLET ORAL
Qty: 21 | Refills: 0
Start: 2024-02-12 | End: 2024-02-18

## 2024-02-12 RX ORDER — MECLIZINE HCL 12.5 MG
25 TABLET ORAL ONCE
Refills: 0 | Status: COMPLETED | OUTPATIENT
Start: 2024-02-12 | End: 2024-02-12

## 2024-02-12 RX ORDER — ONDANSETRON 8 MG/1
4 TABLET, FILM COATED ORAL ONCE
Refills: 0 | Status: COMPLETED | OUTPATIENT
Start: 2024-02-12 | End: 2024-02-12

## 2024-02-12 RX ORDER — SODIUM CHLORIDE 9 MG/ML
1000 INJECTION INTRAMUSCULAR; INTRAVENOUS; SUBCUTANEOUS ONCE
Refills: 0 | Status: COMPLETED | OUTPATIENT
Start: 2024-02-12 | End: 2024-02-12

## 2024-02-12 RX ADMIN — Medication 25 MILLIGRAM(S): at 18:59

## 2024-02-12 RX ADMIN — SODIUM CHLORIDE 1000 MILLILITER(S): 9 INJECTION INTRAMUSCULAR; INTRAVENOUS; SUBCUTANEOUS at 18:59

## 2024-02-12 RX ADMIN — SODIUM CHLORIDE 1000 MILLILITER(S): 9 INJECTION INTRAMUSCULAR; INTRAVENOUS; SUBCUTANEOUS at 21:22

## 2024-02-12 RX ADMIN — ONDANSETRON 4 MILLIGRAM(S): 8 TABLET, FILM COATED ORAL at 18:59

## 2024-02-12 NOTE — ED PROVIDER NOTE - PROGRESS NOTE DETAILS
Patient feels improved. Ambulating with steady gait. Prefers to follow up with neurologist referred by PMD

## 2024-02-12 NOTE — ED PROVIDER NOTE - CLINICAL SUMMARY MEDICAL DECISION MAKING FREE TEXT BOX
83 y/o F PMH of TIA, HTN, HLD, remote vertigo presenting with vertigo and nausea  Symptoms mostly resolved. Nonfocal neurological exam  Check CTH/CTA  Trial on meclizine.   Screening labs, EKG  IVF  Assess ambulation  Likely can follow up with neurology outpatient if above evaluation unremarkable.

## 2024-02-12 NOTE — ED PROVIDER NOTE - PHYSICAL EXAMINATION
GENERAL: no acute distress; well-developed  HEAD:  Atraumatic, Normocephalic  EYES: EOMI, PERRLA, r  ENT: MMM; oropharynx clear  NECK: Supple, No JVD  CHEST/LUNG: Clear to auscultation bilaterally; No wheeze  HEART: Regular rate and rhythm; No murmurs, rubs, or gallops  ABDOMEN: Soft, Nontender, Nondistended; Bowel sounds present  EXTREMITIES:  2+ Peripheral Pulses, No clubbing, cyanosis, or edema  PSYCH: AAOx3  NEUROLOGY: no focal motor or sensory deficits. 5/5 muscle strength in all extremities. CN II-XII grossly intact. Negative Pronator Drift. Normal finger to nose. Normal Gait. HINTS negative.   SKIN: No rashes or lesions

## 2024-02-12 NOTE — ED PROVIDER NOTE - OBJECTIVE STATEMENT
85 y/o F PMH of TIA, HTN, HLD, remote vertigo presenting with vertigo and nausea    States symptoms started last night with associated nausea, but no vomiting or headache. Notes that she was careful while walking today as felt some imbalance but now vertigo and imbalance has improved. No dysarthria or limb weakness. States had similar episode 15 years ago @ [facility] that resolved with medication. Has not had to see neurologist for this condition. No tinnitus or hearing loss.

## 2024-02-12 NOTE — ED PROVIDER NOTE - PATIENT PORTAL LINK FT
You can access the FollowMyHealth Patient Portal offered by [facility] [place] by registering at the following website: [email or web address]. By joining [email or web address]’s FollowMyHealth portal, you will also be able to view your health information using other applications (apps) compatible with our system.

## 2024-02-12 NOTE — ED ADULT NURSE NOTE - NSFALLUNIVINTERV_ED_ALL_ED
Bed/Stretcher in lowest position, wheels locked, appropriate side rails in place/Call bell, personal items and telephone in reach/Instruct patient to call for assistance before getting out of bed/chair/stretcher/Non-slip footwear applied when patient is off stretcher/[place] to call system/Physically safe environment - no spills, clutter or unnecessary equipment/Purposeful proactive rounding/Room/bathroom lighting operational, light cord in reach

## 2024-02-12 NOTE — ED PROVIDER NOTE - NSICDXPASTMEDICALHX_GEN_ALL_CORE_FT
PAST MEDICAL HISTORY:  Arthritis     HLD (hyperlipidemia)     HTN (hypertension)     Insomnia     Osteoporosis     TIA (transient ischemic attack)

## 2024-02-12 NOTE — ED ADULT NURSE NOTE - NSFALLRISK_ED_ALL_ED
Consent was obtained from the patient. The risks, benefits and alternatives to therapy were discussed in detail. Specifically, the risks of infection, scarring, bleeding, prolonged wound healing, nerve injury, incomplete removal, allergy to anesthesia and recurrence were addressed. Alternatives to ED&C, such as: surgical removal and XRT were also discussed.  Prior to the procedure, the treatment site was clearly identified and confirmed by the patient. All components of Universal Protocol/PAUSE Rule completed. Cautery Type: electrodesiccation Size Of Lesion In Cm: 0.4 Detail Level: Detailed Additional Information: (Optional): The wound was cleaned, and a pressure dressing was applied.  The patient received detailed post-op instructions. Add 52 Modifier (Optional): no What Was Performed First?: Curettage Post-Care Instructions: I reviewed with the patient in detail post-care instructions. Patient is to keep the area dry for 48 hours, and not to engage in any swimming until the area is healed. Should the patient develop any fevers, chills, bleeding, severe pain patient will contact the office immediately. Size Of Lesion After Curettage: 0 Anesthesia Type: 1% lidocaine with epinephrine Size Of Lesion In Cm: 0.3 Number Of Curettages: 3 Bill For Surgical Tray: yes No

## 2024-02-12 NOTE — ED PROVIDER NOTE - NSFOLLOWUPINSTRUCTIONS_ED_ALL_ED_FT
1. Meclizine every 8 hours as needed  2. Follow up with neurologist  3. Seek Medical attention or return to the emergency department for any new or worsening symptoms.

## 2024-04-16 ENCOUNTER — OFFICE (OUTPATIENT)
Dept: URBAN - METROPOLITAN AREA CLINIC 35 | Facility: CLINIC | Age: 84
Setting detail: OPHTHALMOLOGY
End: 2024-04-16
Payer: MEDICARE

## 2024-04-16 DIAGNOSIS — H25.12: ICD-10-CM

## 2024-04-16 DIAGNOSIS — H25.13: ICD-10-CM

## 2024-04-16 PROCEDURE — 92136 OPHTHALMIC BIOMETRY: CPT | Mod: TC | Performed by: OPHTHALMOLOGY

## 2024-04-16 PROCEDURE — 92136 OPHTHALMIC BIOMETRY: CPT | Mod: 26,LT | Performed by: OPHTHALMOLOGY

## 2024-04-16 PROCEDURE — 99213 OFFICE O/P EST LOW 20 MIN: CPT | Performed by: OPHTHALMOLOGY

## 2024-04-16 ASSESSMENT — LID EXAM ASSESSMENTS
OD_BLEPHARITIS: RUL 2+
OD_MEIBOMITIS: RLL 1+
OD_DERMATOCHALASIS: 1+
OS_DERMATOCHALASIS: 1+
OS_BLEPHARITIS: LUL 2+
OS_MEIBOMITIS: LLL 1+

## 2024-04-16 ASSESSMENT — LID POSITION - DERMATOCHALASIS
OS_DERMATOCHALASIS: LUL 1+
OD_DERMATOCHALASIS: RUL 1+

## 2024-05-01 ENCOUNTER — RX ONLY (RX ONLY)
Age: 84
End: 2024-05-01

## 2024-05-01 ENCOUNTER — OFFICE (OUTPATIENT)
Dept: URBAN - METROPOLITAN AREA CLINIC 35 | Facility: CLINIC | Age: 84
Setting detail: OPHTHALMOLOGY
End: 2024-05-01
Payer: MEDICARE

## 2024-05-01 DIAGNOSIS — Z96.1: ICD-10-CM

## 2024-05-01 PROCEDURE — 99024 POSTOP FOLLOW-UP VISIT: CPT | Performed by: OPHTHALMOLOGY

## 2024-05-01 ASSESSMENT — LID EXAM ASSESSMENTS
OS_MEIBOMITIS: LLL 1+
OD_MEIBOMITIS: RLL 1+
OS_DERMATOCHALASIS: 1+
OD_BLEPHARITIS: RUL 2+
OS_BLEPHARITIS: LUL 2+
OD_DERMATOCHALASIS: 1+

## 2024-05-01 ASSESSMENT — LID POSITION - DERMATOCHALASIS
OS_DERMATOCHALASIS: LUL 1+
OD_DERMATOCHALASIS: RUL 1+

## 2024-05-01 ASSESSMENT — CONFRONTATIONAL VISUAL FIELD TEST (CVF)
OD_FINDINGS: FULL
OS_FINDINGS: FULL

## 2024-05-08 ENCOUNTER — OFFICE (OUTPATIENT)
Dept: URBAN - METROPOLITAN AREA CLINIC 35 | Facility: CLINIC | Age: 84
Setting detail: OPHTHALMOLOGY
End: 2024-05-08
Payer: MEDICARE

## 2024-05-08 DIAGNOSIS — H25.11: ICD-10-CM

## 2024-05-08 DIAGNOSIS — Z96.1: ICD-10-CM

## 2024-05-08 PROCEDURE — 99024 POSTOP FOLLOW-UP VISIT: CPT | Performed by: OPHTHALMOLOGY

## 2024-05-08 ASSESSMENT — LID EXAM ASSESSMENTS
OS_MEIBOMITIS: LLL 1+
OD_DERMATOCHALASIS: 1+
OS_BLEPHARITIS: LUL 2+
OS_DERMATOCHALASIS: 1+
OD_BLEPHARITIS: RUL 2+
OD_MEIBOMITIS: RLL 1+

## 2024-05-08 ASSESSMENT — LID POSITION - DERMATOCHALASIS
OS_DERMATOCHALASIS: LUL 1+
OD_DERMATOCHALASIS: RUL 1+

## 2024-06-10 ENCOUNTER — OFFICE (OUTPATIENT)
Dept: URBAN - METROPOLITAN AREA CLINIC 35 | Facility: CLINIC | Age: 84
Setting detail: OPHTHALMOLOGY
End: 2024-06-10
Payer: MEDICARE

## 2024-06-10 DIAGNOSIS — H35.363: ICD-10-CM

## 2024-06-10 DIAGNOSIS — Z96.1: ICD-10-CM

## 2024-06-10 DIAGNOSIS — H25.11: ICD-10-CM

## 2024-06-10 PROCEDURE — 99024 POSTOP FOLLOW-UP VISIT: CPT | Performed by: OPHTHALMOLOGY

## 2024-06-10 ASSESSMENT — LID EXAM ASSESSMENTS
OD_DERMATOCHALASIS: 1+
OD_MEIBOMITIS: RLL 1+
OS_BLEPHARITIS: LUL 2+
OS_MEIBOMITIS: LLL 1+
OD_BLEPHARITIS: RUL 2+
OS_DERMATOCHALASIS: 1+

## 2024-06-10 ASSESSMENT — CONFRONTATIONAL VISUAL FIELD TEST (CVF)
OS_FINDINGS: FULL
OD_FINDINGS: FULL

## 2024-06-10 ASSESSMENT — LID POSITION - DERMATOCHALASIS
OS_DERMATOCHALASIS: LUL 1+
OD_DERMATOCHALASIS: RUL 1+

## 2024-06-26 NOTE — DISCHARGE NOTE PROVIDER - NSDCCPCAREPLAN_GEN_ALL_CORE_FT
[facility]: Cancer Care                                                                                          My chart message received from pt regarding question on labs ordered for today.  Writer confirmed that all orders were in and to contact writer if any issues when in lab.    Shira Humphries, EDUARN, RN  RN Care Coordinator  [facility]     PRINCIPAL DISCHARGE DIAGNOSIS  Diagnosis: TIA (transient ischemic attack)  Assessment and Plan of Treatment: TIA (transient ischemic attack)  - START Asprin 325 mg daily, Atorvastatin 40 mg daily, Amlodipine 10 mg daily  - Please follow up with your cardiologist, neurologist, and PCP within 1 week of discharge.      SECONDARY DISCHARGE DIAGNOSES  Diagnosis: Osteoporosis  Assessment and Plan of Treatment: Osteoporosis  - Please continue your home medications    Diagnosis: Pulmonary nodule  Assessment and Plan of Treatment: Pulmonary nodule  Chest X-ray showed that the previously reported nodular opacity within the right lower lung appears unchanged. There is an additional region of nodular opacity identified overlying the right upper lung field measuring approximately 1 cm. You were evaluated by Pulmonologist, Dr. Azul, during your stay here. CT chest was reviewed from [facility] and revealed subcentimeter nodules, suggesting low suspicion for cancer/malignancy and requiring no intervention at this time. Please follow up with pulmonary medicine team at [facility].

## 2025-07-22 ENCOUNTER — NON-APPOINTMENT (OUTPATIENT)
Age: 85
End: 2025-07-22

## 2025-07-30 PROBLEM — I10 ESSENTIAL (PRIMARY) HYPERTENSION: Chronic | Status: ACTIVE | Noted: 2024-02-12

## 2025-07-30 PROBLEM — G45.9 TRANSIENT CEREBRAL ISCHEMIC ATTACK, UNSPECIFIED: Chronic | Status: ACTIVE | Noted: 2024-02-12

## 2025-07-30 PROBLEM — E78.5 HYPERLIPIDEMIA, UNSPECIFIED: Chronic | Status: ACTIVE | Noted: 2024-02-12

## 2025-07-31 ENCOUNTER — APPOINTMENT (OUTPATIENT)
Dept: NEUROLOGY | Facility: CLINIC | Age: 85
End: 2025-07-31
Payer: MEDICARE

## 2025-07-31 VITALS
DIASTOLIC BLOOD PRESSURE: 73 MMHG | HEART RATE: 69 BPM | SYSTOLIC BLOOD PRESSURE: 155 MMHG | HEIGHT: 63 IN | BODY MASS INDEX: 24.27 KG/M2 | WEIGHT: 137 LBS

## 2025-07-31 DIAGNOSIS — G62.9 POLYNEUROPATHY, UNSPECIFIED: ICD-10-CM

## 2025-07-31 DIAGNOSIS — Z86.73 PERSONAL HISTORY OF TRANSIENT ISCHEMIC ATTACK (TIA), AND CEREBRAL INFARCTION W/OUT RESIDUAL DEFICITS: ICD-10-CM

## 2025-07-31 DIAGNOSIS — R29.2 ABNORMAL REFLEX: ICD-10-CM

## 2025-07-31 PROCEDURE — 99204 OFFICE O/P NEW MOD 45 MIN: CPT

## 2025-07-31 RX ORDER — ESCITALOPRAM OXALATE 5 MG/1
5 TABLET, FILM COATED ORAL
Refills: 0 | Status: ACTIVE | COMMUNITY

## 2025-07-31 RX ORDER — METOPROLOL TARTRATE 50 MG/1
TABLET ORAL
Refills: 0 | Status: ACTIVE | COMMUNITY

## 2025-07-31 RX ORDER — ATORVASTATIN CALCIUM 40 MG/1
40 TABLET, FILM COATED ORAL
Refills: 0 | Status: ACTIVE | COMMUNITY

## 2025-08-07 LAB
ESTIMATED AVERAGE GLUCOSE: 117 MG/DL
FOLATE SERPL-MCNC: 8.6 NG/ML
HBA1C MFR BLD HPLC: 5.7 %
HBV SURFACE AG SER QL: NONREACTIVE
HCV AB SER QL: NONREACTIVE
HCV S/CO RATIO: 0.12 S/CO
TSH SERPL-ACNC: 3.85 UIU/ML
VIT B12 SERPL-MCNC: 547 PG/ML

## 2025-08-11 LAB
ALBUMIN MFR SERPL ELPH: 59.2 %
ALBUMIN SERPL-MCNC: 4.4 G/DL
ALBUMIN/GLOB SERPL: 1.4 RATIO
ALPHA1 GLOB MFR SERPL ELPH: 3.4 %
ALPHA1 GLOB SERPL ELPH-MCNC: 0.3 G/DL
ALPHA2 GLOB MFR SERPL ELPH: 9 %
ALPHA2 GLOB SERPL ELPH-MCNC: 0.7 G/DL
B-GLOBULIN MFR SERPL ELPH: 10.5 %
B-GLOBULIN SERPL ELPH-MCNC: 0.8 G/DL
DEPRECATED KAPPA LC FREE/LAMBDA SER: 1.09 RATIO
GAMMA GLOB FLD ELPH-MCNC: 1.3 G/DL
GAMMA GLOB MFR SERPL ELPH: 17.9 %
IGA SERPL-MCNC: 201 MG/DL
IGG SERPL-MCNC: 1307 MG/DL
IGM SERPL-MCNC: 125 MG/DL
INTERPRETATION SERPL IEP-IMP: NORMAL
KAPPA LC CSF-MCNC: 1.72 MG/DL
KAPPA LC SERPL-MCNC: 1.87 MG/DL
M PROTEIN SPEC IFE-MCNC: NORMAL
PROT SERPL-MCNC: 7.5 G/DL
PROT SERPL-MCNC: 7.5 G/DL
VIT B1 SERPL-MCNC: 126.4 NMOL/L

## 2025-08-12 LAB — VIT B6 SERPL-MCNC: 10 UG/L

## 2025-08-14 ENCOUNTER — APPOINTMENT (OUTPATIENT)
Dept: MRI IMAGING | Facility: CLINIC | Age: 85
End: 2025-08-14
Payer: MEDICARE

## 2025-08-14 ENCOUNTER — OUTPATIENT (OUTPATIENT)
Dept: OUTPATIENT SERVICES | Facility: HOSPITAL | Age: 85
LOS: 1 days | End: 2025-08-14
Payer: MEDICARE

## 2025-08-14 DIAGNOSIS — Z86.73 PERSONAL HISTORY OF TRANSIENT ISCHEMIC ATTACK (TIA), AND CEREBRAL INFARCTION WITHOUT RESIDUAL DEFICITS: ICD-10-CM

## 2025-08-14 DIAGNOSIS — R29.2 ABNORMAL REFLEX: ICD-10-CM

## 2025-08-14 PROCEDURE — 72141 MRI NECK SPINE W/O DYE: CPT | Mod: 26

## 2025-08-14 PROCEDURE — 70551 MRI BRAIN STEM W/O DYE: CPT | Mod: 26

## 2025-08-14 PROCEDURE — 72141 MRI NECK SPINE W/O DYE: CPT

## 2025-08-14 PROCEDURE — 70551 MRI BRAIN STEM W/O DYE: CPT
